# Patient Record
Sex: FEMALE | Race: ASIAN | NOT HISPANIC OR LATINO | Employment: FULL TIME | URBAN - METROPOLITAN AREA
[De-identification: names, ages, dates, MRNs, and addresses within clinical notes are randomized per-mention and may not be internally consistent; named-entity substitution may affect disease eponyms.]

---

## 2017-03-28 ENCOUNTER — OFFICE VISIT - HEALTHEAST (OUTPATIENT)
Dept: ALLERGY | Facility: CLINIC | Age: 27
End: 2017-03-28

## 2017-03-28 DIAGNOSIS — L23.9 ALLERGIC CONTACT DERMATITIS: ICD-10-CM

## 2017-05-04 ENCOUNTER — COMMUNICATION - HEALTHEAST (OUTPATIENT)
Dept: OBGYN | Facility: CLINIC | Age: 27
End: 2017-05-04

## 2017-08-07 ENCOUNTER — COMMUNICATION - HEALTHEAST (OUTPATIENT)
Dept: MIDWIFE SERVICES | Facility: CLINIC | Age: 27
End: 2017-08-07

## 2017-10-31 ENCOUNTER — COMMUNICATION - HEALTHEAST (OUTPATIENT)
Dept: MIDWIFE SERVICES | Facility: CLINIC | Age: 27
End: 2017-10-31

## 2017-11-06 ENCOUNTER — COMMUNICATION - HEALTHEAST (OUTPATIENT)
Dept: OBGYN | Facility: CLINIC | Age: 27
End: 2017-11-06

## 2018-01-19 ENCOUNTER — OFFICE VISIT - HEALTHEAST (OUTPATIENT)
Dept: FAMILY MEDICINE | Facility: CLINIC | Age: 28
End: 2018-01-19

## 2018-01-19 DIAGNOSIS — Z00.00 ROUTINE GENERAL MEDICAL EXAMINATION AT A HEALTH CARE FACILITY: ICD-10-CM

## 2018-01-19 DIAGNOSIS — Z12.4 SCREENING FOR CERVICAL CANCER: ICD-10-CM

## 2018-01-22 LAB
BKR LAB AP ABNORMAL BLEEDING: NO
BKR LAB AP BIRTH CONTROL/HORMONES: NORMAL
BKR LAB AP CERVICAL APPEARANCE: NORMAL
BKR LAB AP GYN ADEQUACY: NORMAL
BKR LAB AP GYN INTERPRETATION: NORMAL
BKR LAB AP HPV REFLEX: NORMAL
BKR LAB AP LMP: NORMAL
BKR LAB AP PATIENT STATUS: NORMAL
BKR LAB AP PREVIOUS ABNORMAL: NO
BKR LAB AP PREVIOUS NORMAL: 2015
HIGH RISK?: NO
PATH REPORT.COMMENTS IMP SPEC: NORMAL
RESULT FLAG (HE HISTORICAL CONVERSION): NORMAL

## 2018-12-18 ENCOUNTER — OFFICE VISIT - HEALTHEAST (OUTPATIENT)
Dept: FAMILY MEDICINE | Facility: CLINIC | Age: 28
End: 2018-12-18

## 2018-12-18 DIAGNOSIS — Z00.00 ROUTINE GENERAL MEDICAL EXAMINATION AT A HEALTH CARE FACILITY: ICD-10-CM

## 2018-12-18 DIAGNOSIS — R03.0 ELEVATED BLOOD PRESSURE READING WITHOUT DIAGNOSIS OF HYPERTENSION: ICD-10-CM

## 2018-12-18 ASSESSMENT — MIFFLIN-ST. JEOR: SCORE: 1504.63

## 2019-07-12 ENCOUNTER — OFFICE VISIT - HEALTHEAST (OUTPATIENT)
Dept: FAMILY MEDICINE | Facility: CLINIC | Age: 29
End: 2019-07-12

## 2019-07-12 DIAGNOSIS — B35.3 TINEA PEDIS OF RIGHT FOOT: ICD-10-CM

## 2019-07-12 ASSESSMENT — MIFFLIN-ST. JEOR: SCORE: 1499.01

## 2019-11-23 ENCOUNTER — COMMUNICATION - HEALTHEAST (OUTPATIENT)
Dept: FAMILY MEDICINE | Facility: CLINIC | Age: 29
End: 2019-11-23

## 2019-11-23 DIAGNOSIS — Z78.9 USES BIRTH CONTROL: ICD-10-CM

## 2020-02-14 ENCOUNTER — OFFICE VISIT - HEALTHEAST (OUTPATIENT)
Dept: FAMILY MEDICINE | Facility: CLINIC | Age: 30
End: 2020-02-14

## 2020-02-14 DIAGNOSIS — Z00.00 ROUTINE GENERAL MEDICAL EXAMINATION AT A HEALTH CARE FACILITY: ICD-10-CM

## 2020-02-14 DIAGNOSIS — Z31.69 ENCOUNTER FOR PRECONCEPTION CONSULTATION: ICD-10-CM

## 2020-02-14 DIAGNOSIS — Z30.41 ENCOUNTER FOR SURVEILLANCE OF CONTRACEPTIVE PILLS: ICD-10-CM

## 2020-02-14 LAB — HIV 1+2 AB+HIV1 P24 AG SERPL QL IA: NEGATIVE

## 2020-02-14 RX ORDER — LEVONORGESTREL/ETHIN.ESTRADIOL 0.1-0.02MG
1 TABLET ORAL DAILY
Qty: 84 TABLET | Refills: 3 | Status: ON HOLD | OUTPATIENT
Start: 2020-02-14 | End: 2022-11-27

## 2020-02-14 ASSESSMENT — MIFFLIN-ST. JEOR: SCORE: 1480.65

## 2020-03-02 ENCOUNTER — OFFICE VISIT - HEALTHEAST (OUTPATIENT)
Dept: FAMILY MEDICINE | Facility: CLINIC | Age: 30
End: 2020-03-02

## 2020-03-02 DIAGNOSIS — L71.0 PERIORAL DERMATITIS: ICD-10-CM

## 2020-03-02 RX ORDER — METRONIDAZOLE 10 MG/G
GEL TOPICAL
Qty: 45 G | Refills: 0 | Status: SHIPPED | OUTPATIENT
Start: 2020-03-02 | End: 2024-08-13

## 2020-03-02 RX ORDER — PIMECROLIMUS 10 MG/G
CREAM TOPICAL
Qty: 30 G | Refills: 1 | Status: ON HOLD | OUTPATIENT
Start: 2020-03-02 | End: 2022-11-27

## 2020-03-02 ASSESSMENT — MIFFLIN-ST. JEOR: SCORE: 1497.65

## 2020-03-03 ENCOUNTER — COMMUNICATION - HEALTHEAST (OUTPATIENT)
Dept: FAMILY MEDICINE | Facility: CLINIC | Age: 30
End: 2020-03-03

## 2020-03-17 ENCOUNTER — COMMUNICATION - HEALTHEAST (OUTPATIENT)
Dept: FAMILY MEDICINE | Facility: CLINIC | Age: 30
End: 2020-03-17

## 2020-03-17 DIAGNOSIS — L71.0 PERIORAL DERMATITIS: ICD-10-CM

## 2020-03-18 RX ORDER — ERYTHROMYCIN 20 MG/G
GEL TOPICAL
Qty: 30 G | Refills: 0 | Status: SHIPPED | OUTPATIENT
Start: 2020-03-18 | End: 2024-08-13

## 2020-05-18 ENCOUNTER — COMMUNICATION - HEALTHEAST (OUTPATIENT)
Dept: FAMILY MEDICINE | Facility: CLINIC | Age: 30
End: 2020-05-18

## 2020-05-18 DIAGNOSIS — L71.0 PERIORAL DERMATITIS: ICD-10-CM

## 2020-06-09 ENCOUNTER — RECORDS - HEALTHEAST (OUTPATIENT)
Dept: ADMINISTRATIVE | Facility: OTHER | Age: 30
End: 2020-06-09

## 2021-02-22 ENCOUNTER — AMBULATORY - HEALTHEAST (OUTPATIENT)
Dept: NURSING | Facility: CLINIC | Age: 31
End: 2021-02-22

## 2021-03-15 ENCOUNTER — AMBULATORY - HEALTHEAST (OUTPATIENT)
Dept: NURSING | Facility: CLINIC | Age: 31
End: 2021-03-15

## 2021-05-30 NOTE — PROGRESS NOTES
"Assessment / Impression     1. Tinea pedis of right foot  terbinafine HCl (LAMISIL) 1 % cream         Plan:     Discussed diagnosis with patient.  Recommend topical terbinafine daily for up to 4 weeks.  If symptoms persist or worsen over the next 1 to 2 weeks, she should let me know, consider oral antifungals at that time.    Return in about 2 weeks (around 7/26/2019), or if symptoms worsen or fail to improve.    Subjective:      HPI: Dari Tidwell is a 29 y.o. female who presents for right foot rash.  She noted itchiness between her third and fourth digits of her right foot about 1 week ago, though over time rash feels it has gotten worse.  She has tried to use over-the-counter miconazole powder and clotrimazole cream, though does not feel it has particularly helped.  She has not had any fevers.      Medical History:     Patient Active Problem List   Diagnosis     Overweight     Contraception       No past medical history on file.    No past surgical history on file.    Current Medications:     Current Outpatient Medications   Medication Sig     levonorgestrel-ethinyl estradiol (AVIANE,ALESSE,LESSINA) 0.1-20 mg-mcg per tablet Take 1 tablet by mouth daily.     MULTIVITAMIN ORAL Take by mouth.     terbinafine HCl (LAMISIL) 1 % cream Apply topically at bedtime.       Family History:   No family history on file.    Review of Systems  All other systems reviewed and are negative.         Social History:     Social History     Tobacco Use   Smoking Status Never Smoker   Smokeless Tobacco Never Used     Social History     Social History Narrative    Works as a pharmacy tech.           Objective:     /80 (Patient Site: Right Arm, Patient Position: Sitting, Cuff Size: Adult Regular)   Pulse 62   Temp 98.4  F (36.9  C) (Oral)   Resp 12   Ht 5' 3.7\" (1.618 m)   Wt 177 lb 3.2 oz (80.4 kg)   LMP 07/10/2019 (Exact Date)   Breastfeeding? No   BMI 30.70 kg/m    Physical Examination: General appearance - alert, well " appearing, and in no distress  Eyes: pupils equal and reactive, extraocular eye movements intact  Extremities: No edema, no clubbing or cyanosis.  Right foot: erythematous scaling an erosion noted between 3rd and 4th digital interspaces.  Fissure also noted  Psychiatric: Normal affect. Does not appear anxious or depressed.    No results found for this or any previous visit (from the past 168 hour(s)).      Liv Tomas MD  7/12/2019  9:07 AM

## 2021-06-02 VITALS — HEIGHT: 64 IN | WEIGHT: 178.44 LBS | BODY MASS INDEX: 30.46 KG/M2

## 2021-06-03 VITALS — WEIGHT: 177.2 LBS | HEIGHT: 64 IN | BODY MASS INDEX: 30.25 KG/M2

## 2021-06-03 NOTE — TELEPHONE ENCOUNTER
Refill Approved    Rx renewed per Medication Renewal Policy. Medication was last renewed on 12/18/2018 for 84/3  Last OV 7/12/2019  Dania Grayson, [facility] Triage/Med Refill 11/23/2019     Requested Prescriptions   Pending Prescriptions Disp Refills     LESSINA 0.1-20 mg-mcg per tablet [Pharmacy Med Name: LESSINA TABLETS 28] 84 tablet 0     Sig: TAKE 1 TABLET BY MOUTH ONCE DAILY       Oral Contraceptives Protocol Passed - 11/23/2019  3:11 AM        Passed - Visit with PCP or prescribing provider visit in last 12 months      Last office visit with prescriber/PCP: 7/12/2019 Liv Tomas MD OR same dept: 7/12/2019 Liv Tomas MD OR same specialty: 7/12/2019 Liv Tomas MD  Last physical: 12/18/2018 Last MTM visit: Visit date not found   Next visit within 3 mo: Visit date not found  Next physical within 3 mo: Visit date not found  Prescriber OR PCP: Liv Tomas MD  Last diagnosis associated with med order: 1. Uses birth control  - LESSINA 0.1-20 mg-mcg per tablet [Pharmacy Med Name: LESSINA TABLETS 28]; TAKE 1 TABLET BY MOUTH ONCE DAILY  Dispense: 84 tablet; Refill: 0    If protocol passes may refill for 12 months if within 3 months of last provider visit (or a total of 15 months).

## 2021-06-04 VITALS
HEIGHT: 64 IN | HEART RATE: 66 BPM | WEIGHT: 176.9 LBS | DIASTOLIC BLOOD PRESSURE: 62 MMHG | SYSTOLIC BLOOD PRESSURE: 108 MMHG | BODY MASS INDEX: 30.2 KG/M2 | RESPIRATION RATE: 16 BRPM

## 2021-06-04 VITALS
RESPIRATION RATE: 17 BRPM | HEIGHT: 63 IN | HEART RATE: 84 BPM | SYSTOLIC BLOOD PRESSURE: 136 MMHG | WEIGHT: 175.6 LBS | DIASTOLIC BLOOD PRESSURE: 72 MMHG | BODY MASS INDEX: 31.11 KG/M2

## 2021-06-06 NOTE — PROGRESS NOTES
Assessment / Impression     1. Perioral dermatitis  pimecrolimus (ELIDEL) 1 % cream    metroNIDAZOLE (METROGEL) 1 % gel         Plan:     Discussed with patient findings on exam, appears to be perioral dermatitis.  Given she has already tried topical clotrimazole and terbinafine, first preference would be for topical pimecrolimus.  However, this medication is too expensive, would recommend she try topical metronidazole.  Discussed use.  Follow-up in 2 weeks if symptoms persist, sooner if they worsen.  She may continue to use Aquaphor petroleum jelly as needed.    Return in about 2 weeks (around 3/16/2020), or if symptoms worsen or fail to improve.    Subjective:      HPI: Dari Tidwell is a 29 y.o. female who presents for rash around her lip.  Symptoms started around Christmas, 2019 and severity can fluctuate, though it is relatively mild at this time.  She does put Aquaphor petroleum jelly on it which helps with the dryness, though rash is still present.  She has also tried topical clotrimazole and terbinafine, which has only helped mildly.  No fevers or other rash present on body.        Medical History:     Patient Active Problem List   Diagnosis     Overweight     Uses birth control       No past medical history on file.    No past surgical history on file.    Current Medications:     Current Outpatient Medications   Medication Sig     levonorgestrel-ethinyl estradiol (LESSINA) 0.1-20 mg-mcg per tablet Take 1 tablet by mouth daily.     MULTIVITAMIN ORAL Take by mouth.     metroNIDAZOLE (METROGEL) 1 % gel Apply topically to affected skin twice daily for 1-2 weeks.     pimecrolimus (ELIDEL) 1 % cream Apply to affected area 2 times daily for 1-2 weeks.       Family History:   No family history on file.    Review of Systems  All other systems reviewed and are negative.         Social History:     Social History     Tobacco Use   Smoking Status Never Smoker   Smokeless Tobacco Never Used     Social History  "    Social History Narrative    Works as a pharmacy tech.      Lives with  and in-laws.           Objective:     /62 (Patient Site: Right Arm, Patient Position: Sitting, Cuff Size: Adult Regular)   Pulse 66   Resp 16   Ht 5' 3.7\" (1.618 m)   Wt 176 lb 14.4 oz (80.2 kg)   LMP 02/27/2020 (Approximate)   Breastfeeding No   BMI 30.65 kg/m    Physical Examination: General appearance - alert, well appearing, and in no distress  Eyes: extraocular eye movements intact  Mouth: mucous membranes moist, pharynx normal without lesions  Neck: supple, no significant adenopathy or thyromegaly  Skin: there are multiple 1-2mm pink papules and mild pinkness of skin in perioral region.  No rash elsewhere in body.  Lips normal.  Extremities: No edema, no clubbing or cyanosis  Psychiatric: Normal affect. Does not appear anxious or depressed.    No results found for this or any previous visit (from the past 168 hour(s)).      Liv Tomas MD  3/2/2020  1:48 PM        "

## 2021-06-06 NOTE — TELEPHONE ENCOUNTER
Drug Change Request  Who is calling?:  Patient  What is the current medication?:    Disp Refills Start End     metroNIDAZOLE (METROGEL) 1 % gel 45 g 0 3/2/2020     Sig: Apply topically to affected skin twice daily for 1-2 weeks.    Sent to pharmacy as: metroNIDAZOLE 1 % topical gel (METROGEL)    Notes to Pharmacy: Fill this rx if Elidel is too expensive    E-Prescribing Status: Receipt confirmed by pharmacy (3/2/2020  1:51 PM CST)      What alternative is being requested?: a cheaper option  Why the request to change?:  Patient stated she checked online and the metronidazole gel is too expensive. Patient stated she did not check for the pimecrolimus cream yet. Patient was advised to call the pharmacy about the pimecrolimus cream and call us back if needed.  Requested Pharmacy?: Leonid Urenaay to leave a detailed message?:  Yes

## 2021-06-08 NOTE — TELEPHONE ENCOUNTER
Left message to call back for: M for Pt  Information to relay to patient:  Phone number to the dermatologist is [phone]

## 2021-06-09 NOTE — PROGRESS NOTES
Assessment:    Allergic contact dermatitis with probable Gold sensitivity.    Plan:    Avoidance when possible.  Consider painting inside of gold ring with clear nail polish.  Removal with exercise and sweating.  Patch testing could be done.  We do true test patch testing.  Call to set up if wanting to pursue.  Reviewed alternative metals for replacing band.  ____________________________________________________________________________                                                             Dari is here today because of concern of possible allergy to her wedding ring.  She got it in October 2015.  Since then she's had gradual irritation directly under the ring.  The dry itchy rash.  Usually it's relieved when she takes the ring off.  Often the rash returns within 1-2 weeks of replacing the ring.  She reports that this is 14 carot  White gold.  She has had a previous reaction to a gold ring in 2010.  She reports having a silver ring that she does not respond to.  Generally she has not responded to inexpensive jewelry or earrings.  She reports no reactions to her pants snap.  No other skin rashes.  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  She does frequently wash her hands.  She does wear latex free sterile gloves.      Review of symptoms:  as above otherwise negative.    Past medical history: No other chronic medical conditions noted.    Allergies: No known allergies to medications, latex , foods or hymenoptera venom.    Family history: Mother with contact allergy to nickel    Social history: Currently is looking the same house for 23 years.  There is a dog in the home.  No cigarette smoking history.  Work history as above.    Medications: Reviewed in chart.    Physical Exam:  General:  Alert and oriented.  Eyes:  Sclera clear.  Ears: TMs translucent grey with bony landmarks visible. Nose: Pale, boggy mucosal membranes.  Throat: Pink, mosit.  No lesions.  Neck: Supple.  No  lymphadenopathy.  Lungs: CTA.  CV: Regular rate and rhythm. Extremities: Well perfused.  No clubbing or cyanosis. Skin:  Faint erythema thickened skin underneath the left hand fourth digit, beneath her ring.    This transcription uses voice recognition software, which may contain typographical errors.

## 2021-06-15 PROBLEM — Z78.9 USES BIRTH CONTROL: Status: ACTIVE | Noted: 2017-05-08

## 2021-06-15 NOTE — PROGRESS NOTES
Assessment:      Healthy female exam.     1. Routine general medical examination at a health care facility     2. Contraception  levonorgestrel-ethinyl estradiol (AVIANE,ALESSE,LESSINA) 0.1-20 mg-mcg per tablet   3. Screening for cervical cancer  Gynecologic Cytology (PAP Smear)        Plan:     1. Routine general medical examination at a health care facility      2. Contraception  Refilled rx today  - levonorgestrel-ethinyl estradiol (AVIANE,ALESSE,LESSINA) 0.1-20 mg-mcg per tablet; Take 1 tablet by mouth daily.  Dispense: 84 tablet; Refill: 3    3. Screening for cervical cancer  Done today-will inform patient of results and plan once we have results  - Gynecologic Cytology (PAP Smear)      The following high BMI interventions were performed this visit: dietary management education, guidance, and counseling      Pap smear.     Subjective:      Dari Tidwell is a 27 y.o. female, new to me, who presents for an annual exam. The patient is sexually active. The patient participates in regular exercise: no. The patient reports that there is not domestic violence in her life. Additional concerns:     Would like OCP refilled.  Hasn't had break in use.  Non smoker.  No other contraindications of use.      Healthy Habits:   Regular Exercise: No  Sunscreen Use: Yes and On face and neck   Healthy Diet: Yes  Dental Visits Regularly: Yes  Seat Belt: Yes  Sexually active: Yes  Self Breast Exam Monthly:Yes  Hemoccults: No  Flex Sig: No  Colonoscopy: No  Lipid Profile: No  Glucose Screen: No  Prevention of Osteoporosis: N/A  Last Dexa: N/A        Immunization History   Administered Date(s) Administered     HPV Quadrivalent 11/06/2008, 01/15/2009, 05/08/2009     Hep B, Adult 09/05/2014, 10/22/2014, 06/09/2015     Hep B, historic 09/05/2014, 10/22/2014     Influenza, inj, historic,unspecified 01/01/1900     Influenza, seasonal,quad inj 6-35 mos 10/22/2014     Tdap 07/28/2014     Immunization status: up to date and documented.    No  exam data present    Gynecologic History  Patient's last menstrual period was 01/09/2018.  Contraception: OCP (estrogen/progesterone)  Last Pap: 8/15. Results were: normal  Last mammogram: n/a.     OB History   No data available       Current Outpatient Prescriptions   Medication Sig Dispense Refill     MULTIVITAMIN ORAL Take by mouth.       levonorgestrel-ethinyl estradiol (AVIANE,ALESSE,LESSINA) 0.1-20 mg-mcg per tablet Take 1 tablet by mouth daily. 84 tablet 3     misoprostol (CYTOTEC) 200 MCG tablet Two tablets intravaginally 6-8 hours before procedure 2 tablet 0     No current facility-administered medications for this visit.      History reviewed. No pertinent past medical history.  History reviewed. No pertinent surgical history.  Review of patient's allergies indicates no known allergies.  History reviewed. No pertinent family history.  Social History     Social History     Marital status:      Spouse name: N/A     Number of children: N/A     Years of education: N/A     Occupational History     Not on file.     Social History Main Topics     Smoking status: Never Smoker     Smokeless tobacco: Never Used     Alcohol use Yes      Comment: rare; less than 1 drink/month     Drug use: Not on file     Sexual activity: Yes     Partners: Male     Other Topics Concern     Not on file     Social History Narrative    Works as a pharmacy tech.         Review of Systems  General:  Denies problem  Eyes: Denies problem  Ears/Nose/Throat: Denies problem  Cardiovascular: Denies problem  Respiratory:  Denies problem  Gastrointestinal:  Denies problem  Genitourinary: Denies problem  Musculoskeletal:  Denies problem  Skin: Denies problem  Neurologic: Denies problem  Psychiatric: Denies problem  Endocrine: Denies problem  Heme/Lymphatic: Denies problem   Allergic/Immunologic: Denies problem        Objective:         Vitals:    01/19/18 1157   BP: 114/72   Pulse: 68     There is no height or weight on file to calculate  BMI.    Physical Exam:  General Appearance: Alert, cooperative, no distress, appears stated age  Head: Normocephalic, without obvious abnormality, atraumatic  Eyes: PERRL, conjunctiva/corneas clear, EOM's intact  Ears: Normal TM's and external ear canals, both ears  Nose: Nares normal, septum midline,mucosa normal, no drainage  Throat: Lips, mucosa, and tongue normal; teeth and gums normal  Neck: Supple, symmetrical, trachea midline, no adenopathy;  thyroid: not enlarged, symmetric, no tenderness/mass/nodules;   Back: Symmetric, no curvature, ROM normal, no CVA tenderness  Lungs: Clear to auscultation bilaterally, respirations unlabored  Breasts: No breast masses, tenderness, asymmetry, or nipple discharge.  Heart: Regular rate and rhythm, no murmur.   Abdomen: Soft, non-tender, bowel sounds active all four quadrants,  no masses, no organomegaly  Pelvic:Normally developed genitalia with no external lesions or eruptions. Vagina and cervix show no lesions, inflammation, discharge or tenderness. Uterus normal size.  No adnexal mass or tenderness.  Extremities: Extremities normal, atraumatic, no cyanosis or edema  Skin: Skin color, texture, turgor normal, no rashes or lesions  Lymph nodes: Cervical, supraclavicular, and axillary nodes normal  Neurologic: Alert.   Psych: Normal affect.  Does not appear anxious or depressed.         Liv Tomas MD  1/19/2018

## 2021-06-26 ENCOUNTER — HEALTH MAINTENANCE LETTER (OUTPATIENT)
Age: 31
End: 2021-06-26

## 2021-06-27 NOTE — PROGRESS NOTES
Progress Notes by Liv Tomas MD at 12/18/2018  8:50 AM     Author: Liv Tomas MD Service: -- Author Type: Physician    Filed: 12/18/2018  9:19 AM Encounter Date: 12/18/2018 Status: Signed    : Liv Tomas MD (Physician)       FEMALE PREVENTATIVE EXAM    Assessment and Plan:       1. Routine general medical examination at a health care facility      2. Contraception  3. Elevated blood pressure reading without diagnosis of hypertension  - levonorgestrel-ethinyl estradiol (AVIANE,ALESSE,LESSINA) 0.1-20 mg-mcg per tablet; Take 1 tablet by mouth daily.  Dispense: 84 tablet; Refill: 3  Discussed with patient that her blood pressure is mildly elevated today.  Discussed with patient potential adverse effects of elevated blood pressure, with being on only OCP.    Next follow up:  Return in about 3 months (around 3/18/2019) for nurse visit to check blood pressure.    Immunization Review  Adult Imm Review: No immunizations due today  BMI: Discussed exercise and weight loss treatment      I discussed the following with the patient:   Adult Healthy Living: Importance of regular exercise        Subjective:   Chief Complaint: Dari Tidwell is an 28 y.o. female here for a preventative health visit.     HPI:  She would like refill of OCP today.  No adverse effects of medication.      Healthy Habits  Are you taking a daily aspirin? No  Do you typically exercising at least 40 min, 3-4 times per week?  NO  Do you usually eat at least 4 servings of fruit and vegetables a day, include whole grains and fiber and avoid regularly eating high fat foods? NO  Have you had an eye exam in the past two years? NO  Do you see a dentist twice per year? Yes  Do you have any concerns regarding sleep? No    Safety Screen  If you own firearms, are they secured in a locked gun cabinet or with trigger locks? Yes  Do you feel you are safe where you are living?: Yes (12/18/2018  8:56 AM)  Do you feel you are safe in your  "relationship(s)?: Yes (12/18/2018  8:56 AM)      Review of Systems:  Please see above.  The rest of the review of systems are negative for all systems.     Pap History:   No - age 21-29 PAP every 3 years recommended  Cancer Screening       Status Date      PAP SMEAR Next Due 1/19/2021      Done 1/19/2018 GYNECOLOGIC CYTOLOGY (PAP SMEAR)     Patient has more history with this topic...          Patient Care Team:  Liv Tomas MD as PCP - General (Family Medicine)        History     Reviewed By Date/Time Sections Reviewed    Liv Tomas MD 12/18/2018  9:06 AM Medical, Surgical, Family    [place], Jacquelyn ROBBINS CMA 12/18/2018  8:56 AM Tobacco            Objective:   Vital Signs:   Visit Vitals  /80 (Patient Site: Left Arm, Patient Position: Sitting, Cuff Size: Adult Regular)   Pulse 72   Temp 98.6  F (37  C) (Oral)   Resp 20   Ht 5' 3.7\" (1.618 m)   Wt 178 lb 7 oz (80.9 kg)   LMP 12/05/2018 (Exact Date)   Breastfeeding? No   BMI 30.92 kg/m           PHYSICAL EXAM  General Appearance: Alert, cooperative, no distress, appears stated age  Head: Normocephalic, without obvious abnormality, atraumatic  Eyes: PERRL, conjunctiva/corneas clear, EOM's intact  Ears: Normal TM's and external ear canals, both ears  Nose: Nares normal, septum midline,mucosa normal, no drainage  Throat: Lips, mucosa, and tongue normal; teeth and gums normal  Neck: Supple, symmetrical, trachea midline, no adenopathy;  thyroid: not enlarged, symmetric, no tenderness/mass/nodules;   Back: Symmetric, no curvature, ROM normal, no CVA tenderness  Lungs: Clear to auscultation bilaterally, respirations unlabored  Breasts: No breast masses, tenderness, asymmetry, or nipple discharge.  Heart: Regular rate and rhythm, no murmur.   Abdomen: Soft, non-tender, bowel sounds active all four quadrants,  no masses, no organomegaly  Pelvic:Not examined  Extremities: Extremities normal, atraumatic, no cyanosis or edema  Skin: Skin color, texture, turgor " normal, no rashes or lesions  Lymph nodes: Cervical, supraclavicular, and axillary nodes normal  Neurologic: Alert.   Psych: Normal affect.  Does not appear anxious or depressed.               Medication List           Accurate as of 12/18/18  9:16 AM. If you have any questions, ask your nurse or doctor.               CONTINUE taking these medications    levonorgestrel-ethinyl estradiol 0.1-20 mg-mcg per tablet  Also known as:  ROD RANGEL LESSINA  INSTRUCTIONS:  Take 1 tablet by mouth daily.        MULTIVITAMIN ORAL  INSTRUCTIONS:  Take by mouth.           STOP taking these medications    miSOPROStol 200 MCG tablet  Also known as:  CYTOTEC  Stopped by:  Liv Tomas MD           Where to Get Your Medications      These medications were sent to [facility] [address] [place] [address] [address] AT [place] [address] [address], [facility] [address]    Phone:  [phone]     levonorgestrel-ethinyl estradiol 0.1-20 mg-mcg per tablet         Additional Screenings Completed Today:

## 2021-06-28 NOTE — PROGRESS NOTES
Progress Notes by Liv Tomas MD at 2/14/2020  3:30 PM     Author: Liv Tomas MD Service: -- Author Type: Physician    Filed: 2/14/2020  4:03 PM Encounter Date: 2/14/2020 Status: Signed    : Liv Tomas MD (Physician)       FEMALE PREVENTATIVE EXAM    Assessment and Plan:     1. Routine general medical examination at a health care facility  Discussed one-time HIV screening, ordered today, will inform patient of results.  - HIV Antigen/Antibody Screening [place]    2. Encounter for surveillance of contraceptive pills  Blood pressure is slightly elevated today.  She will continue to periodically monitor blood pressure, certainly if it is consistently above 140/90, she will let me know.  She does understand the potential increased risk with taking OCP with elevated blood pressure, such as risk of stroke.  - levonorgestrel-ethinyl estradiol (LESSINA) 0.1-20 mg-mcg per tablet; Take 1 tablet by mouth daily.  Dispense: 84 tablet; Refill: 3    3. Encounter for preconception consultation  Counseled patient regarding stopping OCP a few months prior to trying to conceive.  Also discussed starting prenatal vitamin a few months prior to trying to conceive.          Next follow up:  Return in about 1 year (around 2/14/2021) for Annual physical.    Immunization Review  Adult Imm Review: No immunizations due today  BMI: Discussed weight loss, lifestyle changes    I discussed the following with the patient:   Adult Healthy Living: Importance of regular exercise  Healthy nutrition  Weight loss referral options      Subjective:   Chief Complaint: Dari Tidwell is an 29 y.o. female here for a preventative health visit.     HPI: She would like her OCP refilled today.  She does not anticipate being on medication for an entire year, as she and her  are thinking about trying to conceive soon.  Blood pressure is mildly elevated today, though denies any headache, chest pain, vision changes.  She does check her  "blood pressure at work periodically, has never had it measured this high.    Healthy Habits  Are you taking a daily aspirin? No  Do you typically exercising at least 40 min, 3-4 times per week?  NO  Do you usually eat at least 4 servings of fruit and vegetables a day, include whole grains and fiber and avoid regularly eating high fat foods? NO  Have you had an eye exam in the past two years? Yes  Do you see a dentist twice per year? Yes  Do you have any concerns regarding sleep? No    Safety Screen  If you own firearms, are they secured in a locked gun cabinet or with trigger locks? The patient does not own any firearms  Do you feel you are safe where you are living?: Yes (2/14/2020  3:35 PM)  Do you feel you are safe in your relationship(s)?: Yes (2/14/2020  3:35 PM)      Review of Systems:  Please see above.  The rest of the review of systems are negative for all systems.     Pap History:   No - age 21-29 PAP every 3 years recommended  Cancer Screening       Status Date      PAP SMEAR Next Due 1/19/2021      Done 1/19/2018 GYNECOLOGIC CYTOLOGY (PAP SMEAR)     Patient has more history with this topic...          Patient Care Team:  Liv Tomas MD as PCP - General (Family Medicine)  Liv Tomas MD as Assigned PCP        History     Reviewed By Date/Time Sections Reviewed    Liv Tomas MD 2/14/2020  3:45 PM Tobacco, Alcohol, Drug Use, Sexual Activity, Social Documentation    Liv Tomas MD 2/14/2020  3:44 PM Medical, Surgical, Family    Norah Dhillon [facility] 2/14/2020  3:37 PM Tobacco            Objective:   Vital Signs:   Visit Vitals  /72   Pulse 84   Resp 17   Ht 5' 3\" (1.6 m)   Wt 175 lb 9.6 oz (79.7 kg)   LMP 01/24/2020 (Approximate)   BMI 31.11 kg/m           PHYSICAL EXAM  General Appearance: Alert, cooperative, no distress, appears stated age  Head: Normocephalic, without obvious abnormality, atraumatic  Eyes: PERRL, conjunctiva/corneas clear, EOM's intact  Ears: Normal TM's and " external ear canals, both ears  Nose: Nares normal, septum midline,mucosa normal, no drainage  Throat: Lips, mucosa, and tongue normal; teeth and gums normal  Neck: Supple, symmetrical, trachea midline, no adenopathy;  thyroid: not enlarged, symmetric, no tenderness/mass/nodules;   Back: Symmetric, no curvature, ROM normal, no CVA tenderness  Lungs: Clear to auscultation bilaterally, respirations unlabored  Breasts: No breast masses, tenderness, asymmetry, or nipple discharge.  Heart: Regular rate and rhythm, no murmur.   Abdomen: Soft, non-tender, bowel sounds active all four quadrants,  no masses, no organomegaly  Pelvic:Not examined  Extremities: Extremities normal, atraumatic, no cyanosis or edema  Skin: Skin color, texture, turgor normal, no rashes or lesions  Lymph nodes: Cervical, supraclavicular, and axillary nodes normal  Neurologic: Alert.   Psych: Normal affect.  Does not appear anxious or depressed.               Medication List          Accurate as of February 14, 2020  4:00 PM. If you have any questions, ask your nurse or doctor.            CONTINUE taking these medications    levonorgestrel-ethinyl estradiol 0.1-20 mg-mcg per tablet  Also known as:  Lessina  INSTRUCTIONS:  Take 1 tablet by mouth daily.        MULTIVITAMIN ORAL  INSTRUCTIONS:  Take by mouth.              Where to Get Your Medications      These medications were sent to [id number] [place] [address] AT [place] [address], [facility] [id number]    Phone:  [phone]     levonorgestrel-ethinyl estradiol 0.1-20 mg-mcg per tablet         Additional Screenings Completed Today:

## 2021-10-16 ENCOUNTER — HEALTH MAINTENANCE LETTER (OUTPATIENT)
Age: 31
End: 2021-10-16

## 2022-01-28 ENCOUNTER — HOSPITAL ENCOUNTER (OUTPATIENT)
Dept: RADIOLOGY | Facility: CLINIC | Age: 32
Discharge: HOME | End: 2022-01-28
Attending: OBSTETRICS & GYNECOLOGY | Admitting: OBSTETRICS & GYNECOLOGY
Payer: COMMERCIAL

## 2022-01-28 DIAGNOSIS — Z31.9 ENCOUNTER FOR PROCREATIVE MANAGEMENT, UNSPECIFIED: ICD-10-CM

## 2022-01-28 PROCEDURE — 74740 X-RAY FEMALE GENITAL TRACT: CPT

## 2022-01-28 PROCEDURE — 58340 CATHETER FOR HYSTEROGRAPHY: CPT

## 2022-01-28 RX ORDER — IOPAMIDOL 612 MG/ML
10 INJECTION, SOLUTION INTRAVASCULAR ONCE
Status: DISCONTINUED | OUTPATIENT
Start: 2022-01-28 | End: 2022-01-29 | Stop reason: HOSPADM

## 2022-07-23 ENCOUNTER — HEALTH MAINTENANCE LETTER (OUTPATIENT)
Age: 32
End: 2022-07-23

## 2022-10-01 ENCOUNTER — HEALTH MAINTENANCE LETTER (OUTPATIENT)
Age: 32
End: 2022-10-01

## 2022-11-09 ENCOUNTER — LAB REQUISITION (OUTPATIENT)
Dept: LAB | Facility: CLINIC | Age: 32
End: 2022-11-09
Payer: COMMERCIAL

## 2022-11-09 DIAGNOSIS — Z36.3 ENCOUNTER FOR ANTENATAL SCREENING FOR MALFORMATIONS: ICD-10-CM

## 2022-11-09 PROCEDURE — 87653 STREP B DNA AMP PROBE: CPT | Mod: ORL | Performed by: OBSTETRICS & GYNECOLOGY

## 2022-11-10 LAB — GP B STREP DNA SPEC QL NAA+PROBE: NEGATIVE

## 2022-11-25 ENCOUNTER — ANESTHESIA EVENT (OUTPATIENT)
Dept: OBGYN | Facility: HOSPITAL | Age: 32
End: 2022-11-25
Payer: COMMERCIAL

## 2022-11-25 ENCOUNTER — ANESTHESIA (OUTPATIENT)
Dept: OBGYN | Facility: HOSPITAL | Age: 32
End: 2022-11-25
Payer: COMMERCIAL

## 2022-11-25 ENCOUNTER — HOSPITAL ENCOUNTER (INPATIENT)
Facility: HOSPITAL | Age: 32
LOS: 2 days | Discharge: HOME | End: 2022-11-27
Attending: OBSTETRICS & GYNECOLOGY | Admitting: OBSTETRICS & GYNECOLOGY
Payer: COMMERCIAL

## 2022-11-25 PROBLEM — Z34.90 PREGNANT: Status: ACTIVE | Noted: 2022-11-25

## 2022-11-25 LAB
ABO/RH(D): NORMAL
ANTIBODY SCREEN: NEGATIVE
HOLD SPECIMEN: NORMAL
SARS-COV-2 RNA RESP QL NAA+PROBE: NEGATIVE
SPECIMEN EXPIRATION DATE: NORMAL
T PALLIDUM AB SER QL: NONREACTIVE

## 2022-11-25 PROCEDURE — 86901 BLOOD TYPING SEROLOGIC RH(D): CPT | Performed by: OBSTETRICS & GYNECOLOGY

## 2022-11-25 PROCEDURE — 250N000011 HC RX IP 250 OP 636: Performed by: ANESTHESIOLOGY

## 2022-11-25 PROCEDURE — 370N000003 HC ANESTHESIA WARD SERVICE

## 2022-11-25 PROCEDURE — 250N000009 HC RX 250: Performed by: ANESTHESIOLOGY

## 2022-11-25 PROCEDURE — 250N000013 HC RX MED GY IP 250 OP 250 PS 637: Performed by: OBSTETRICS & GYNECOLOGY

## 2022-11-25 PROCEDURE — 00HU33Z INSERTION OF INFUSION DEVICE INTO SPINAL CANAL, PERCUTANEOUS APPROACH: ICD-10-PCS | Performed by: ANESTHESIOLOGY

## 2022-11-25 PROCEDURE — 722N000001 HC LABOR CARE VAGINAL DELIVERY SINGLE

## 2022-11-25 PROCEDURE — 0KQM0ZZ REPAIR PERINEUM MUSCLE, OPEN APPROACH: ICD-10-PCS | Performed by: OBSTETRICS & GYNECOLOGY

## 2022-11-25 PROCEDURE — 86780 TREPONEMA PALLIDUM: CPT | Performed by: OBSTETRICS & GYNECOLOGY

## 2022-11-25 PROCEDURE — 250N000011 HC RX IP 250 OP 636: Performed by: OBSTETRICS & GYNECOLOGY

## 2022-11-25 PROCEDURE — 3E0R3BZ INTRODUCTION OF ANESTHETIC AGENT INTO SPINAL CANAL, PERCUTANEOUS APPROACH: ICD-10-PCS | Performed by: ANESTHESIOLOGY

## 2022-11-25 PROCEDURE — 36415 COLL VENOUS BLD VENIPUNCTURE: CPT | Performed by: OBSTETRICS & GYNECOLOGY

## 2022-11-25 PROCEDURE — 120N000001 HC R&B MED SURG/OB

## 2022-11-25 PROCEDURE — U0003 INFECTIOUS AGENT DETECTION BY NUCLEIC ACID (DNA OR RNA); SEVERE ACUTE RESPIRATORY SYNDROME CORONAVIRUS 2 (SARS-COV-2) (CORONAVIRUS DISEASE [COVID-19]), AMPLIFIED PROBE TECHNIQUE, MAKING USE OF HIGH THROUGHPUT TECHNOLOGIES AS DESCRIBED BY CMS-2020-01-R: HCPCS | Performed by: OBSTETRICS & GYNECOLOGY

## 2022-11-25 RX ORDER — OXYTOCIN 10 [USP'U]/ML
10 INJECTION, SOLUTION INTRAMUSCULAR; INTRAVENOUS
Status: DISCONTINUED | OUTPATIENT
Start: 2022-11-25 | End: 2022-11-27 | Stop reason: HOSPADM

## 2022-11-25 RX ORDER — KETOROLAC TROMETHAMINE 30 MG/ML
30 INJECTION, SOLUTION INTRAMUSCULAR; INTRAVENOUS
Status: DISCONTINUED | OUTPATIENT
Start: 2022-11-25 | End: 2022-11-27 | Stop reason: HOSPADM

## 2022-11-25 RX ORDER — ACETAMINOPHEN 325 MG/1
650 TABLET ORAL EVERY 4 HOURS PRN
Status: DISCONTINUED | OUTPATIENT
Start: 2022-11-25 | End: 2022-11-27 | Stop reason: HOSPADM

## 2022-11-25 RX ORDER — NALOXONE HYDROCHLORIDE 0.4 MG/ML
0.4 INJECTION, SOLUTION INTRAMUSCULAR; INTRAVENOUS; SUBCUTANEOUS
Status: DISCONTINUED | OUTPATIENT
Start: 2022-11-25 | End: 2022-11-25 | Stop reason: HOSPADM

## 2022-11-25 RX ORDER — OXYTOCIN/0.9 % SODIUM CHLORIDE 30/500 ML
340 PLASTIC BAG, INJECTION (ML) INTRAVENOUS CONTINUOUS PRN
Status: DISCONTINUED | OUTPATIENT
Start: 2022-11-25 | End: 2022-11-25 | Stop reason: HOSPADM

## 2022-11-25 RX ORDER — CARBOPROST TROMETHAMINE 250 UG/ML
250 INJECTION, SOLUTION INTRAMUSCULAR
Status: DISCONTINUED | OUTPATIENT
Start: 2022-11-25 | End: 2022-11-25 | Stop reason: HOSPADM

## 2022-11-25 RX ORDER — FENTANYL CITRATE-0.9 % NACL/PF 10 MCG/ML
100 PLASTIC BAG, INJECTION (ML) INTRAVENOUS EVERY 5 MIN PRN
Status: DISCONTINUED | OUTPATIENT
Start: 2022-11-25 | End: 2022-11-25 | Stop reason: HOSPADM

## 2022-11-25 RX ORDER — FENTANYL CITRATE 50 UG/ML
100 INJECTION, SOLUTION INTRAMUSCULAR; INTRAVENOUS
Status: DISCONTINUED | OUTPATIENT
Start: 2022-11-25 | End: 2022-11-25 | Stop reason: HOSPADM

## 2022-11-25 RX ORDER — IBUPROFEN 800 MG/1
800 TABLET, FILM COATED ORAL EVERY 6 HOURS PRN
Status: DISCONTINUED | OUTPATIENT
Start: 2022-11-25 | End: 2022-11-27 | Stop reason: HOSPADM

## 2022-11-25 RX ORDER — IBUPROFEN 800 MG/1
800 TABLET, FILM COATED ORAL
Status: DISCONTINUED | OUTPATIENT
Start: 2022-11-25 | End: 2022-11-27 | Stop reason: HOSPADM

## 2022-11-25 RX ORDER — METHYLERGONOVINE MALEATE 0.2 MG/ML
200 INJECTION INTRAVENOUS
Status: DISCONTINUED | OUTPATIENT
Start: 2022-11-25 | End: 2022-11-25 | Stop reason: HOSPADM

## 2022-11-25 RX ORDER — MISOPROSTOL 200 UG/1
400 TABLET ORAL
Status: DISCONTINUED | OUTPATIENT
Start: 2022-11-25 | End: 2022-11-27 | Stop reason: HOSPADM

## 2022-11-25 RX ORDER — NALOXONE HYDROCHLORIDE 0.4 MG/ML
0.2 INJECTION, SOLUTION INTRAMUSCULAR; INTRAVENOUS; SUBCUTANEOUS
Status: DISCONTINUED | OUTPATIENT
Start: 2022-11-25 | End: 2022-11-25 | Stop reason: HOSPADM

## 2022-11-25 RX ORDER — MISOPROSTOL 200 UG/1
800 TABLET ORAL
Status: DISCONTINUED | OUTPATIENT
Start: 2022-11-25 | End: 2022-11-25 | Stop reason: HOSPADM

## 2022-11-25 RX ORDER — DOCUSATE SODIUM 100 MG/1
100 CAPSULE, LIQUID FILLED ORAL DAILY
Status: DISCONTINUED | OUTPATIENT
Start: 2022-11-25 | End: 2022-11-27 | Stop reason: HOSPADM

## 2022-11-25 RX ORDER — METHYLERGONOVINE MALEATE 0.2 MG/ML
200 INJECTION INTRAVENOUS
Status: DISCONTINUED | OUTPATIENT
Start: 2022-11-25 | End: 2022-11-27 | Stop reason: HOSPADM

## 2022-11-25 RX ORDER — CITRIC ACID/SODIUM CITRATE 334-500MG
30 SOLUTION, ORAL ORAL
Status: DISCONTINUED | OUTPATIENT
Start: 2022-11-25 | End: 2022-11-25 | Stop reason: HOSPADM

## 2022-11-25 RX ORDER — NALBUPHINE HYDROCHLORIDE 10 MG/ML
2.5-5 INJECTION, SOLUTION INTRAMUSCULAR; INTRAVENOUS; SUBCUTANEOUS EVERY 6 HOURS PRN
Status: DISCONTINUED | OUTPATIENT
Start: 2022-11-25 | End: 2022-11-27 | Stop reason: HOSPADM

## 2022-11-25 RX ORDER — MODIFIED LANOLIN
OINTMENT (GRAM) TOPICAL
Status: DISCONTINUED | OUTPATIENT
Start: 2022-11-25 | End: 2022-11-27 | Stop reason: HOSPADM

## 2022-11-25 RX ORDER — BISACODYL 10 MG
10 SUPPOSITORY, RECTAL RECTAL DAILY PRN
Status: DISCONTINUED | OUTPATIENT
Start: 2022-11-25 | End: 2022-11-27 | Stop reason: HOSPADM

## 2022-11-25 RX ORDER — MISOPROSTOL 200 UG/1
800 TABLET ORAL
Status: DISCONTINUED | OUTPATIENT
Start: 2022-11-25 | End: 2022-11-27 | Stop reason: HOSPADM

## 2022-11-25 RX ORDER — CARBOPROST TROMETHAMINE 250 UG/ML
250 INJECTION, SOLUTION INTRAMUSCULAR
Status: DISCONTINUED | OUTPATIENT
Start: 2022-11-25 | End: 2022-11-27 | Stop reason: HOSPADM

## 2022-11-25 RX ORDER — LIDOCAINE HYDROCHLORIDE AND EPINEPHRINE 15; 5 MG/ML; UG/ML
INJECTION, SOLUTION EPIDURAL PRN
Status: DISCONTINUED | OUTPATIENT
Start: 2022-11-25 | End: 2022-11-25

## 2022-11-25 RX ORDER — OXYTOCIN/0.9 % SODIUM CHLORIDE 30/500 ML
340 PLASTIC BAG, INJECTION (ML) INTRAVENOUS CONTINUOUS PRN
Status: DISCONTINUED | OUTPATIENT
Start: 2022-11-25 | End: 2022-11-27 | Stop reason: HOSPADM

## 2022-11-25 RX ORDER — PROCHLORPERAZINE MALEATE 10 MG
10 TABLET ORAL EVERY 6 HOURS PRN
Status: DISCONTINUED | OUTPATIENT
Start: 2022-11-25 | End: 2022-11-25 | Stop reason: HOSPADM

## 2022-11-25 RX ORDER — MISOPROSTOL 200 UG/1
400 TABLET ORAL
Status: DISCONTINUED | OUTPATIENT
Start: 2022-11-25 | End: 2022-11-25 | Stop reason: HOSPADM

## 2022-11-25 RX ORDER — HYDROCORTISONE 25 MG/G
CREAM TOPICAL 3 TIMES DAILY PRN
Status: DISCONTINUED | OUTPATIENT
Start: 2022-11-25 | End: 2022-11-27 | Stop reason: HOSPADM

## 2022-11-25 RX ORDER — ONDANSETRON 2 MG/ML
4 INJECTION INTRAMUSCULAR; INTRAVENOUS EVERY 6 HOURS PRN
Status: DISCONTINUED | OUTPATIENT
Start: 2022-11-25 | End: 2022-11-25 | Stop reason: HOSPADM

## 2022-11-25 RX ORDER — FENTANYL/ROPIVACAINE/NS/PF 2MCG/ML-.1
PLASTIC BAG, INJECTION (ML) EPIDURAL
Status: DISCONTINUED | OUTPATIENT
Start: 2022-11-25 | End: 2022-11-25 | Stop reason: HOSPADM

## 2022-11-25 RX ORDER — METOCLOPRAMIDE HYDROCHLORIDE 5 MG/ML
10 INJECTION INTRAMUSCULAR; INTRAVENOUS EVERY 6 HOURS PRN
Status: DISCONTINUED | OUTPATIENT
Start: 2022-11-25 | End: 2022-11-25 | Stop reason: HOSPADM

## 2022-11-25 RX ORDER — OXYTOCIN 10 [USP'U]/ML
10 INJECTION, SOLUTION INTRAMUSCULAR; INTRAVENOUS
Status: DISCONTINUED | OUTPATIENT
Start: 2022-11-25 | End: 2022-11-25 | Stop reason: HOSPADM

## 2022-11-25 RX ORDER — ONDANSETRON 4 MG/1
4 TABLET, ORALLY DISINTEGRATING ORAL EVERY 6 HOURS PRN
Status: DISCONTINUED | OUTPATIENT
Start: 2022-11-25 | End: 2022-11-25 | Stop reason: HOSPADM

## 2022-11-25 RX ORDER — OXYTOCIN/0.9 % SODIUM CHLORIDE 30/500 ML
100-340 PLASTIC BAG, INJECTION (ML) INTRAVENOUS CONTINUOUS PRN
Status: DISCONTINUED | OUTPATIENT
Start: 2022-11-25 | End: 2022-11-27 | Stop reason: HOSPADM

## 2022-11-25 RX ORDER — METOCLOPRAMIDE 10 MG/1
10 TABLET ORAL EVERY 6 HOURS PRN
Status: DISCONTINUED | OUTPATIENT
Start: 2022-11-25 | End: 2022-11-25 | Stop reason: HOSPADM

## 2022-11-25 RX ORDER — PROCHLORPERAZINE 25 MG
25 SUPPOSITORY, RECTAL RECTAL EVERY 12 HOURS PRN
Status: DISCONTINUED | OUTPATIENT
Start: 2022-11-25 | End: 2022-11-25 | Stop reason: HOSPADM

## 2022-11-25 RX ADMIN — ACETAMINOPHEN 650 MG: 325 TABLET, FILM COATED ORAL at 11:55

## 2022-11-25 RX ADMIN — BENZOCAINE AND LEVOMENTHOL: 200; 5 SPRAY TOPICAL at 11:15

## 2022-11-25 RX ADMIN — ACETAMINOPHEN 650 MG: 325 TABLET, FILM COATED ORAL at 16:31

## 2022-11-25 RX ADMIN — ACETAMINOPHEN 650 MG: 325 TABLET, FILM COATED ORAL at 07:41

## 2022-11-25 RX ADMIN — WITCH HAZEL: 500 SOLUTION RECTAL; TOPICAL at 11:14

## 2022-11-25 RX ADMIN — IBUPROFEN 800 MG: 800 TABLET ORAL at 14:00

## 2022-11-25 RX ADMIN — Medication: at 04:36

## 2022-11-25 RX ADMIN — DOCUSATE SODIUM 100 MG: 100 CAPSULE, LIQUID FILLED ORAL at 07:42

## 2022-11-25 RX ADMIN — KETOROLAC TROMETHAMINE 30 MG: 30 INJECTION, SOLUTION INTRAMUSCULAR at 07:41

## 2022-11-25 RX ADMIN — LIDOCAINE HYDROCHLORIDE,EPINEPHRINE BITARTRATE 3 ML: 15; .005 INJECTION, SOLUTION EPIDURAL; INFILTRATION; INTRACAUDAL; PERINEURAL at 04:41

## 2022-11-25 RX ADMIN — LIDOCAINE HYDROCHLORIDE,EPINEPHRINE BITARTRATE 5 ML: 15; .005 INJECTION, SOLUTION EPIDURAL; INFILTRATION; INTRACAUDAL; PERINEURAL at 04:45

## 2022-11-25 ASSESSMENT — ACTIVITIES OF DAILY LIVING (ADL)
ADLS_ACUITY_SCORE: 18
CHANGE_IN_FUNCTIONAL_STATUS_SINCE_ONSET_OF_CURRENT_ILLNESS/INJURY: NO
TOILETING_ISSUES: NO
DIFFICULTY_EATING/SWALLOWING: NO
FALL_HISTORY_WITHIN_LAST_SIX_MONTHS: NO
ADLS_ACUITY_SCORE: 18
ADLS_ACUITY_SCORE: 18
DOING_ERRANDS_INDEPENDENTLY_DIFFICULTY: NO
DIFFICULTY_COMMUNICATING: NO
ADLS_ACUITY_SCORE: 18
ADLS_ACUITY_SCORE: 18
WALKING_OR_CLIMBING_STAIRS_DIFFICULTY: NO
ADLS_ACUITY_SCORE: 18
HEARING_DIFFICULTY_OR_DEAF: NO
ADLS_ACUITY_SCORE: 18
DRESSING/BATHING_DIFFICULTY: NO
WEAR_GLASSES_OR_BLIND: NO
ADLS_ACUITY_SCORE: 18
CONCENTRATING,_REMEMBERING_OR_MAKING_DECISIONS_DIFFICULTY: NO

## 2022-11-25 ASSESSMENT — ENCOUNTER SYMPTOMS: SEIZURES: 0

## 2022-11-25 NOTE — L&D DELIVERY NOTE
VAGINAL DELIVERY NOTE    PRE DELIVERY DIAGNOSIS  1) Intrauterine pregnancy at 39w1d       POST DELIVERY DIAGNOSIS  1) Intrauterine pregnancy at 39w1d   2) Delivery of a viable male.  3) Apgars: 9 and 9  4) weight: pending    Delivery Method/Type:       PROVIDER:   Jennifer Scherer DO     ANESTHESIA: Epidural    COMPLICATIONS: None apparent    DESCRIPTION OF PROCEDURE  Dari Tidwell is a 32 year old  with prenatal care with Niesha who was admitted for labor. Patient had a .  Delayed cord clamping performed.  No gross fetal defects were observed. Pitocin was administered. Placenta delivered intact with 3 vessel cord. The perineum was then evaluated and noted to have a 2nd  Degree labial laceration that was repaired in the usual manner with 3-0 Vicryl Rapide. Delivery QBL (mL): 200    Jennifer Scherer DO

## 2022-11-25 NOTE — PROGRESS NOTES
Pt presented to [facility] for c/o labor that started around 6810-6366 am this morning.  Pt denies, leaking fluids, reports bloody show, denies headache, visual changes and epigastric.  Pt oriented  to room and call light.  EFM and toco applied.  SVE: 7/60/0 with bloody show.  EFM: category one,  dae very 1-3 minutes. Palpating strong.   Pt desires labor epidural.    Dr. Medina update, orders received to admit pt an place intrapartum orders, pt ok to have labor epidural.    IV placed and started, fluids for epidural.

## 2022-11-25 NOTE — ANESTHESIA PROCEDURE NOTES
"Epidural catheter Procedure Note    Pre-Procedure   Staff -        Anesthesiologist:  Gael Biswas MD       Performed By: anesthesiologist       Location: OB       Procedure Start/Stop Times: 11/25/2022 4:28 AM and 11/25/2022 4:43 AM       Pre-Anesthestic Checklist: patient identified, IV checked, risks and benefits discussed, informed consent, monitors and equipment checked, pre-op evaluation, at physician/surgeon's request and post-op pain management  Timeout:       Correct Patient: Yes        Correct Procedure: Yes        Correct Site: Yes        Correct Position: Yes   Procedure Documentation  Procedure: epidural catheter       Patient Position: sitting       Skin prep: Chloraprep       Local skin infiltrated with mL of 1% lidocaine.        Insertion Site: L3-4. (midline approach).       Technique: LORT air        AIXA at 5 cm.       Needle Type: [facility]       Needle Gauge: 20.        Needle Length (Inches): 3.5           Catheter threaded easily.           Threaded 10 cm at skin.         # of attempts: 1 and  # of redirects:     Assessment/Narrative         Paresthesias: No.       Test dose of 3 mL lidocaine 1.5% w/ 1:200,000 epinephrine at.         Test dose negative, 3 minutes after injection, for signs of intravascular, subdural, or intrathecal injection.       Insertion/Infusion Method: LORT air       Aspiration negative for Heme or CSF via Epidural Catheter.    Medication(s) Administered   Medication Administration Time: 11/25/2022 4:28 AM      FOR [facility] ([facility]) ONLY:   Pain Team Contact information: please page the Pain Team Via Teamisto. Search \"Pain\". During daytime hours, please page the attending first. At night please page the resident first.    "

## 2022-11-25 NOTE — PROGRESS NOTES
Outreach Nurse Note    Dari Tidwell  [id number]  1990    This writer, Outreach Nurse, rounded on couplet to inform them of the insurance check completed re: home nurse visit coverage. Informed Dari a  apt was made for Monday,  at [facility], in case she declines a home visit. Dari plans to stay 2 nights and decisions will be made at time of discharge. (Note: Several clinics in the area have limited open appointments with the Thanksgiving holiday, so Outreach was proactive and scheduled a  clinic visit for Monday.)     Dari's address and phone numbers confirmed in case a home nurse visit is needed. Dari reports she has baby care essentials.  Outreach RN will continue to follow and assist if needed with discharge plan. No additional needs identified at this time.    Santiago Mancera RN

## 2022-11-25 NOTE — PLAN OF CARE
Problem: Plan of Care - These are the overarching goals to be used throughout the patient stay.    Goal: Optimal Comfort and Wellbeing  Outcome: Progressing  Intervention: Provide Person-Centered Care  Recent Flowsheet Documentation  Taken 11/25/2022 1600 by Kerrie Mondragon, RN  Trust Relationship/Rapport:   care explained   choices provided   questions encouraged   emotional support provided   Pain well managed with PRN Tylenol and Motrin, she rates pain 2/10. VSS, requires assistance with breastfeeding Krerie Mondragon, RN

## 2022-11-25 NOTE — H&P
"OB HISTORY AND PHYSICAL UPDATE ADMISSION EXAM    Dari Tidwell  1990  [id number]    HPI: 33yo  @ 39 1/7wks.  Uncomplicated pregnancy.  Josseline Scherer.     Estimated Date of Delivery: Dec 1, 2022                       EGA 39w1d    OB History    Para Term  AB Living   1 0 0 0 0 0   SAB IAB Ectopic Multiple Live Births   0 0 0 0 0      # Outcome Date GA Lbr Yuniel/2nd Weight Sex Delivery Anes PTL Lv   1 Current                Prenatal Complications None    Exam:    /67   Temp 98  F (36.7  C) (Oral)   Resp 20   Ht 1.626 m (5' 4\")   Wt 84.4 kg (186 lb)   SpO2 100%   BMI 31.93 kg/m          HEENT          WNL              Heart              WNL               Lungs             WNL                      Abdomen        WNL                       Extremities     WNL                     Vaginal exam 10/100/0      Fetal Status     Category 1    Assessment: Labor    Plan: Spontaneous labor, anticipate vaginal delivery    Jennifer Scherer DO, FACOG  2022 6:29 AM     "

## 2022-11-25 NOTE — ANESTHESIA PREPROCEDURE EVALUATION
Anesthesia Pre-Procedure Evaluation    Patient: Dari Tidwell   MRN: [id number] : 1990        Procedure : * No procedures listed *          History reviewed. No pertinent past medical history.   Past Surgical History:   Procedure Laterality Date     WISDOM TOOTH EXTRACTION        No Known Allergies   Social History     Tobacco Use     Smoking status: Never     Smokeless tobacco: Never   Substance Use Topics     Alcohol use: Yes     Comment: Alcoholic Drinks/day: rare; less than 1 drink/month      Wt Readings from Last 1 Encounters:   22 84.4 kg (186 lb)        Anesthesia Evaluation            ROS/MED HX  ENT/Pulmonary:  - neg pulmonary ROS  (-) sleep apnea   Neurologic:  - neg neurologic ROS  (-) no seizures and no CVA   Cardiovascular:  - neg cardiovascular ROS     METS/Exercise Tolerance:     Hematologic:  - neg hematologic  ROS     Musculoskeletal:  - neg musculoskeletal ROS     GI/Hepatic:  - neg GI/hepatic ROS     Renal/Genitourinary:  - neg Renal ROS     Endo:  - neg endo ROS     Psychiatric/Substance Use:  - neg psychiatric ROS     Infectious Disease:  - neg infectious disease ROS     Malignancy:       Other:      (+) Possibly pregnant, ,         Physical Exam    Airway        Mallampati: II   TM distance: > 3 FB   Neck ROM: full     Respiratory Devices and Support         Dental           Cardiovascular             Pulmonary                   OUTSIDE LABS:  CBC: No results found for: WBC, HGB, HCT, PLT  BMP: No results found for: NA, POTASSIUM, CHLORIDE, CO2, BUN, CR, GLC  COAGS: No results found for: PTT, INR, FIBR  POC: No results found for: BGM, HCG, HCGS  HEPATIC: No results found for: ALBUMIN, PROTTOTAL, ALT, AST, GGT, ALKPHOS, BILITOTAL, BILIDIRECT, WILDA  OTHER: No results found for: PH, LACT, A1C, KIERAN, PHOS, MAG, LIPASE, AMYLASE, TSH, T4, T3, CRP, SED    Anesthesia Plan    ASA Status:  2      Anesthesia Type: Epidural.              Consents    Anesthesia Plan(s) and associated risks,  benefits, and realistic alternatives discussed. Questions answered and patient/representative(s) expressed understanding.    - Discussed:     - Discussed with:  Patient         Postoperative Care            Comments:                Gael Biswas MD

## 2022-11-25 NOTE — PROGRESS NOTES
Pt had prolong decelerations starting 0504  FHR down to 60's.   See flow sheet for interventions.  Provider at beside to assess pt.

## 2022-11-26 LAB — HGB BLD-MCNC: 11 G/DL (ref 11.7–15.7)

## 2022-11-26 PROCEDURE — 250N000013 HC RX MED GY IP 250 OP 250 PS 637: Performed by: OBSTETRICS & GYNECOLOGY

## 2022-11-26 PROCEDURE — 85018 HEMOGLOBIN: CPT | Performed by: OBSTETRICS & GYNECOLOGY

## 2022-11-26 PROCEDURE — 120N000001 HC R&B MED SURG/OB

## 2022-11-26 PROCEDURE — 36415 COLL VENOUS BLD VENIPUNCTURE: CPT | Performed by: OBSTETRICS & GYNECOLOGY

## 2022-11-26 RX ADMIN — ACETAMINOPHEN 650 MG: 325 TABLET, FILM COATED ORAL at 04:54

## 2022-11-26 RX ADMIN — IBUPROFEN 800 MG: 800 TABLET ORAL at 22:38

## 2022-11-26 RX ADMIN — DOCUSATE SODIUM 100 MG: 100 CAPSULE, LIQUID FILLED ORAL at 09:10

## 2022-11-26 RX ADMIN — IBUPROFEN 800 MG: 800 TABLET ORAL at 10:11

## 2022-11-26 ASSESSMENT — ACTIVITIES OF DAILY LIVING (ADL)
ADLS_ACUITY_SCORE: 18

## 2022-11-26 NOTE — PLAN OF CARE
Minimal pain, treated with ibuprofen with good relief. Voiding without difficulty, ambulating in room.   Problem: Plan of Care - These are the overarching goals to be used throughout the patient stay.    Goal: Plan of Care Review  Description: The Plan of Care Review/Shift note should be completed every shift.  The Outcome Evaluation is a brief statement about your assessment that the patient is improving, declining, or no change.  This information will be displayed automatically on your shift note.  Outcome: Progressing     Problem: Plan of Care - These are the overarching goals to be used throughout the patient stay.    Goal: Optimal Comfort and Wellbeing  Intervention: Monitor Pain and Promote Comfort  Recent Flowsheet Documentation  Taken 11/26/2022 1011 by Malini Faust RN  Pain Management Interventions:    medication (see MAR)    emotional support

## 2022-11-26 NOTE — PROGRESS NOTES
"Postpartum Day 1: Vaginal Delivery    Subjective:  The patient feels well. The patient has no emotional concerns. Pain is controlled with current medications. The patient is ambulating well. She is voiding and passing gas.The amount and color of the lochia is appropriate for the duration of recovery, patient denies clots. The baby is doing great.    Objective   The patient has a blood pressure which is within the normal range. The uterine fundus is firm. Urinary output is adequate.     Exam:   /70   Pulse 82   Temp 98.5  F (36.9  C) (Oral)   Resp 16   Ht 1.626 m (5' 4\")   Wt 84.4 kg (186 lb)   SpO2 98%   Breastfeeding Unknown   BMI 31.93 kg/m    General: NAD  Abdomen: soft, NT   Fundus: firm, NT  Ext: no pain     Impression:   Normal postpartum course    Plan:   - Continue current care.  - Home tomorrow.        Jennifer Scherer DO, FACOG  11/26/2022 10:33 AM     "

## 2022-11-26 NOTE — PLAN OF CARE
Problem: Plan of Care - These are the overarching goals to be used throughout the patient stay.    Goal: Patient-Specific Goal (Individualized) Dari has frozen colostrum stored here to be used if baby is hungry after breastfeeding, and she will continue to pump.  Description: Outcome: Progressing

## 2022-11-26 NOTE — ANESTHESIA POSTPROCEDURE EVALUATION
Patient: Dari Tidwell    Procedure: * No procedures listed *       Anesthesia Type:  Epidural    Note:  Disposition: Inpatient   Postop Pain Control: Uneventful            Sign Out: Well controlled pain   PONV: No   Neuro/Psych: Uneventful            Sign Out: Acceptable/Baseline neuro status   Airway/Respiratory: Uneventful            Sign Out: Acceptable/Baseline resp. status   CV/Hemodynamics: Uneventful            Sign Out: Acceptable CV status; No obvious hypovolemia; No obvious fluid overload   Other NRE: NONE   DID A NON-ROUTINE EVENT OCCUR? No    Event details/Postop Comments:  Recovering as expected, ambulating, no headaches, no concerns.             Last vitals:  Vitals:    11/25/22 1330 11/25/22 1549 11/26/22 0030   BP: 118/79 127/72 136/70   Pulse: 78 82    Resp: 20 16 16   Temp: 36.8  C (98.2  F) 36.8  C (98.3  F) 36.9  C (98.5  F)   SpO2:  98%        Electronically Signed By: Gael Biswas MD  November 26, 2022  7:35 AM

## 2022-11-27 VITALS
RESPIRATION RATE: 16 BRPM | BODY MASS INDEX: 31.76 KG/M2 | WEIGHT: 186 LBS | OXYGEN SATURATION: 98 % | DIASTOLIC BLOOD PRESSURE: 77 MMHG | SYSTOLIC BLOOD PRESSURE: 134 MMHG | HEIGHT: 64 IN | TEMPERATURE: 97.1 F | HEART RATE: 85 BPM

## 2022-11-27 PROCEDURE — 250N000013 HC RX MED GY IP 250 OP 250 PS 637: Performed by: OBSTETRICS & GYNECOLOGY

## 2022-11-27 RX ORDER — IBUPROFEN 800 MG/1
800 TABLET, FILM COATED ORAL EVERY 6 HOURS PRN
COMMUNITY
Start: 2022-11-27 | End: 2024-08-13

## 2022-11-27 RX ORDER — ACETAMINOPHEN 325 MG/1
650 TABLET ORAL EVERY 4 HOURS PRN
COMMUNITY
Start: 2022-11-27 | End: 2024-08-13

## 2022-11-27 RX ORDER — DOCUSATE SODIUM 100 MG/1
100 CAPSULE, LIQUID FILLED ORAL DAILY PRN
COMMUNITY
Start: 2022-11-27 | End: 2024-08-13

## 2022-11-27 RX ADMIN — DOCUSATE SODIUM 100 MG: 100 CAPSULE, LIQUID FILLED ORAL at 08:46

## 2022-11-27 RX ADMIN — IBUPROFEN 800 MG: 800 TABLET ORAL at 08:46

## 2022-11-27 ASSESSMENT — ACTIVITIES OF DAILY LIVING (ADL)
ADLS_ACUITY_SCORE: 18

## 2022-11-27 NOTE — DISCHARGE SUMMARY
"    HISTORY OF PRESENT ILLNESS AND HOSPITAL COURSE: This is a 32 year old,  female who underwent Normal spontaneous vaginal delivery. Post partum course was unremarkable. On the day that she was discharged, vital signs were stable. Her pain was controlled, she was tolerating diet. She was voiding and passing gas. Current with flu vaccine.    LABS:  Lab Results   Component Value Date    HGB 11.0 (L) 2022       EXAM:  Blood pressure 126/62, pulse 85, temperature 98  F (36.7  C), temperature source Oral, resp. rate 16, height 1.626 m (5' 4\"), weight 84.4 kg (186 lb), SpO2 98 %, unknown if currently breastfeeding.  General: NAD  Abdomen: Soft, non-tender  Uterine fundus: Firm, non-tender  Extremities: no pain, 1+ ankle edema, no calf tenderness    DISPOSITION:  Home    CONDITION AT DISCHARGE:  Good/Stable    Discharge Medications:   See AVS    DISCHARGE PLAN:   - Follow up with  MD, in 4-6 weeks, unless indicated sooner  - Take medication as prescribed  - Physical activity: As tolerated, no heavy lifting. Pelvic rest.  - Diet:  Regular  - Medication:  Please see MAR  - Warning signs discussed with patient about when to call the clinic/hospital  - All questions and concerns were answered for the patient prior to discharge.     Juani Gaitan MD FACOG  [facility]  [phone]    I saw the patient on the date of discharge  Total time spent for discharge on date of discharge: 20 minutes    "

## 2022-11-27 NOTE — LACTATION NOTE
Lactation consultant to patient room to assess breastfeeding (history, goals, & current experience). Mom states she thinks breastfeeding is off to a good start and has no concerns at this time.    Reviewed benefit of skin to skin prior to feeding to help get baby awake and ready for feeding, importance of feeding baby on early hunger cues, and breastfeeding 8-12 times in 24 hours for adequate infant nutrition and hydration as well as for building an optimal milk supply.  Reviewed techniques for keeping infant actively sucking, including breast compressions.    Anticipatory guidance given on input/output goals, normal feeding amounts the first week, cluster feeding, engorgement, and pumping. Reviewed online and outpatient lactation resources for breastfeeding help after discharge.     Answered questions and gave encouragement.

## 2022-11-27 NOTE — PLAN OF CARE
Problem: Postpartum (Vaginal Delivery)  Goal: Optimal Pain Control and Function  11/27/2022 0115 by Kerrie Mondragon, RN  Outcome: Progressing  11/26/2022 1900 by Kerrie Mondragon RN  Outcome: Progressing    Dari received Motrin for 3/10 perineal pain. Sitz bath  2 x daily discuss ed for comfort. Kerrie Mondragon, RN

## 2022-11-27 NOTE — PLAN OF CARE
Dari has met Postpartum recovery goals and meets discharge criteria. Dari has bene seen by provider and orders received. Discharge instructions reviewed. Patient discharge home with spouse and baby.

## 2022-11-27 NOTE — PLAN OF CARE
Problem: Postpartum (Vaginal Delivery)  Goal: Successful Maternal Role Transition  Outcome: Progressing     Problem: Postpartum (Vaginal Delivery)  Goal: Optimal Pain Control and Function  Outcome: Progressing   Stable Post-partum recovery, breastfeeding getting better, pain well managed wiht Tylenol /Motrin prn. Dari is meeting discharge goals Kerrie Mondragon RN

## 2022-11-28 ENCOUNTER — MEDICAL CORRESPONDENCE (OUTPATIENT)
Dept: FAMILY MEDICINE | Facility: CLINIC | Age: 32
End: 2022-11-28

## 2023-01-16 ENCOUNTER — LAB REQUISITION (OUTPATIENT)
Dept: LAB | Facility: CLINIC | Age: 33
End: 2023-01-16

## 2023-01-16 DIAGNOSIS — Z12.4 ENCOUNTER FOR SCREENING FOR MALIGNANT NEOPLASM OF CERVIX: ICD-10-CM

## 2023-01-16 PROCEDURE — G0145 SCR C/V CYTO,THINLAYER,RESCR: HCPCS | Performed by: OBSTETRICS & GYNECOLOGY

## 2023-01-19 LAB
BKR LAB AP GYN ADEQUACY: NORMAL
BKR LAB AP GYN INTERPRETATION: NORMAL
BKR LAB AP HPV REFLEX: NORMAL
BKR LAB AP LMP: NORMAL
BKR LAB AP PREVIOUS ABNL DX: NORMAL
BKR LAB AP PREVIOUS ABNORMAL: NORMAL
PATH REPORT.COMMENTS IMP SPEC: NORMAL
PATH REPORT.COMMENTS IMP SPEC: NORMAL
PATH REPORT.RELEVANT HX SPEC: NORMAL

## 2023-02-21 ENCOUNTER — MEDICAL CORRESPONDENCE (OUTPATIENT)
Dept: HEALTH INFORMATION MANAGEMENT | Facility: CLINIC | Age: 33
End: 2023-02-21
Payer: COMMERCIAL

## 2023-03-14 ENCOUNTER — MEDICAL CORRESPONDENCE (OUTPATIENT)
Dept: HEALTH INFORMATION MANAGEMENT | Facility: CLINIC | Age: 33
End: 2023-03-14

## 2023-05-25 ENCOUNTER — MEDICAL CORRESPONDENCE (OUTPATIENT)
Dept: HEALTH INFORMATION MANAGEMENT | Facility: CLINIC | Age: 33
End: 2023-05-25
Payer: COMMERCIAL

## 2023-08-06 ENCOUNTER — HEALTH MAINTENANCE LETTER (OUTPATIENT)
Age: 33
End: 2023-08-06

## 2024-02-07 ENCOUNTER — VIRTUAL VISIT (OUTPATIENT)
Dept: URGENT CARE | Facility: CLINIC | Age: 34
End: 2024-02-07
Payer: COMMERCIAL

## 2024-02-07 DIAGNOSIS — H10.33 ACUTE BACTERIAL CONJUNCTIVITIS OF BOTH EYES: Primary | ICD-10-CM

## 2024-02-07 PROCEDURE — 99441 PR PHYSICIAN TELEPHONE EVALUATION 5-10 MIN: CPT | Mod: 95

## 2024-02-07 RX ORDER — POLYMYXIN B SULFATE AND TRIMETHOPRIM 1; 10000 MG/ML; [USP'U]/ML
1-2 SOLUTION OPHTHALMIC EVERY 4 HOURS
Qty: 10 ML | Refills: 0 | Status: SHIPPED | OUTPATIENT
Start: 2024-02-07 | End: 2024-08-13

## 2024-02-07 NOTE — PATIENT INSTRUCTIONS
1. Warm compress with a clean wet washcloth 4 times a day  2. Use lubricating eye drops AVOID redness reducing drops  3. Practice good hand hygiene. Avoid sharing linens such as pillowcases, towels, or wash clothes. Wash these items on hot to prevent spreading.  4. Take daily antihistamine such as Claritin, Allegra or Zyrtec to help with itching, swelling or discomfort  5. Ice pack to the eyes 10 minutes in AM and PM  6. Follow up if symptoms worsen or if not getting better within 4 days

## 2024-02-07 NOTE — PROGRESS NOTES
Dari is a 33 year old female who presents for a billable telephone visit.   ASSESSMENT/PLAN:  Diagnoses and all orders for this visit:    Acute bacterial conjunctivitis of both eyes  -     polymixin b-trimethoprim (POLYTRIM) 23793-5.1 UNIT/ML-% ophthalmic solution; Place 1-2 drops into both eyes every 4 hours      Patient Instructions   1. Warm compress with a clean wet washcloth 4 times a day  2. Use lubricating eye drops AVOID redness reducing drops  3. Practice good hand hygiene. Avoid sharing linens such as pillowcases, towels, or wash clothes. Wash these items on hot to prevent spreading.  4. Take daily antihistamine such as Claritin, Allegra or Zyrtec to help with itching, swelling or discomfort  5. Ice pack to the eyes 10 minutes in AM and PM  6. Follow up if symptoms worsen or if not getting better within 4 days      Follow up with primary care provider with any problems, questions or concerns or if symptoms worsen or fail to improve. Patient agreed to plan and verbalized understanding.     SUBJECTIVE:  Dari presents with reports of eye redness, crustiness x 1-2 days. It is both eyes. She denies vision changes. She does not wear contact lenses and no trauma or injury. Her son just completed treatment for pink eye.     ROS: Pertinent ROS neg other than the symptoms noted above in the HPI.     OBJECTIVE:  Vitals not done due to this being a virtual visit  GEN: No distress  RESP: No cough, no audible wheezing, able to talk in full sentences  Remainder of exam unable to be completed due to telephone visits    Barney Yepez PA-C    Call length: 8 minutes  Provider location during call: Home  Patient location during call: Home

## 2024-06-04 ENCOUNTER — TRANSFERRED RECORDS (OUTPATIENT)
Dept: HEALTH INFORMATION MANAGEMENT | Facility: CLINIC | Age: 34
End: 2024-06-04

## 2024-06-04 ENCOUNTER — LAB REQUISITION (OUTPATIENT)
Dept: LAB | Facility: CLINIC | Age: 34
End: 2024-06-04

## 2024-06-04 DIAGNOSIS — Z3A.10 10 WEEKS GESTATION OF PREGNANCY: ICD-10-CM

## 2024-06-04 PROCEDURE — 87086 URINE CULTURE/COLONY COUNT: CPT | Performed by: NURSE PRACTITIONER

## 2024-06-05 LAB — BACTERIA UR CULT: NO GROWTH

## 2024-06-10 ENCOUNTER — TRANSFERRED RECORDS (OUTPATIENT)
Dept: HEALTH INFORMATION MANAGEMENT | Facility: CLINIC | Age: 34
End: 2024-06-10

## 2024-06-26 ENCOUNTER — TRANSFERRED RECORDS (OUTPATIENT)
Dept: HEALTH INFORMATION MANAGEMENT | Facility: CLINIC | Age: 34
End: 2024-06-26

## 2024-06-26 ENCOUNTER — LAB REQUISITION (OUTPATIENT)
Dept: LAB | Facility: CLINIC | Age: 34
End: 2024-06-26

## 2024-06-26 DIAGNOSIS — Z01.419 ENCOUNTER FOR GYNECOLOGICAL EXAMINATION (GENERAL) (ROUTINE) WITHOUT ABNORMAL FINDINGS: ICD-10-CM

## 2024-06-26 LAB
ABO/RH(D): NORMAL
ANTIBODY SCREEN: NEGATIVE
BASOPHILS # BLD AUTO: 0 10E3/UL (ref 0–0.2)
BASOPHILS NFR BLD AUTO: 0 %
EOSINOPHIL # BLD AUTO: 0.1 10E3/UL (ref 0–0.7)
EOSINOPHIL NFR BLD AUTO: 1 %
ERYTHROCYTE [DISTWIDTH] IN BLOOD BY AUTOMATED COUNT: 12.5 % (ref 10–15)
HCT VFR BLD AUTO: 35.9 % (ref 35–47)
HGB BLD-MCNC: 11.9 G/DL (ref 11.7–15.7)
IMM GRANULOCYTES # BLD: 0 10E3/UL
IMM GRANULOCYTES NFR BLD: 0 %
LYMPHOCYTES # BLD AUTO: 1.6 10E3/UL (ref 0.8–5.3)
LYMPHOCYTES NFR BLD AUTO: 18 %
MCH RBC QN AUTO: 29.8 PG (ref 26.5–33)
MCHC RBC AUTO-ENTMCNC: 33.1 G/DL (ref 31.5–36.5)
MCV RBC AUTO: 90 FL (ref 78–100)
MONOCYTES # BLD AUTO: 0.4 10E3/UL (ref 0–1.3)
MONOCYTES NFR BLD AUTO: 5 %
NEUTROPHILS # BLD AUTO: 6.8 10E3/UL (ref 1.6–8.3)
NEUTROPHILS NFR BLD AUTO: 76 %
NRBC # BLD AUTO: 0 10E3/UL
NRBC BLD AUTO-RTO: 0 /100
PLATELET # BLD AUTO: 251 10E3/UL (ref 150–450)
RBC # BLD AUTO: 4 10E6/UL (ref 3.8–5.2)
SPECIMEN EXPIRATION DATE: NORMAL
WBC # BLD AUTO: 9 10E3/UL (ref 4–11)

## 2024-06-26 PROCEDURE — 85025 COMPLETE CBC W/AUTO DIFF WBC: CPT | Performed by: OBSTETRICS & GYNECOLOGY

## 2024-06-26 PROCEDURE — 87340 HEPATITIS B SURFACE AG IA: CPT | Performed by: OBSTETRICS & GYNECOLOGY

## 2024-06-26 PROCEDURE — 86900 BLOOD TYPING SEROLOGIC ABO: CPT | Performed by: OBSTETRICS & GYNECOLOGY

## 2024-06-26 PROCEDURE — 86762 RUBELLA ANTIBODY: CPT | Performed by: OBSTETRICS & GYNECOLOGY

## 2024-06-26 PROCEDURE — 86803 HEPATITIS C AB TEST: CPT | Performed by: OBSTETRICS & GYNECOLOGY

## 2024-06-26 PROCEDURE — 87491 CHLMYD TRACH DNA AMP PROBE: CPT | Performed by: OBSTETRICS & GYNECOLOGY

## 2024-06-27 LAB
C TRACH DNA SPEC QL PROBE+SIG AMP: NEGATIVE
HBV SURFACE AG SERPL QL IA: NONREACTIVE
HCV AB SERPL QL IA: NONREACTIVE
N GONORRHOEA DNA SPEC QL NAA+PROBE: NEGATIVE
RUBV IGG SERPL QL IA: 0.5 INDEX
RUBV IGG SERPL QL IA: NORMAL

## 2024-07-15 ENCOUNTER — TRANSFERRED RECORDS (OUTPATIENT)
Dept: HEALTH INFORMATION MANAGEMENT | Facility: CLINIC | Age: 34
End: 2024-07-15
Payer: COMMERCIAL

## 2024-07-16 ENCOUNTER — MEDICAL CORRESPONDENCE (OUTPATIENT)
Dept: HEALTH INFORMATION MANAGEMENT | Facility: CLINIC | Age: 34
End: 2024-07-16
Payer: COMMERCIAL

## 2024-07-17 ENCOUNTER — TRANSCRIBE ORDERS (OUTPATIENT)
Dept: MATERNAL FETAL MEDICINE | Facility: HOSPITAL | Age: 34
End: 2024-07-17
Payer: COMMERCIAL

## 2024-07-17 DIAGNOSIS — O26.90 PREGNANCY RELATED CONDITION, ANTEPARTUM: Primary | ICD-10-CM

## 2024-07-24 ENCOUNTER — TRANSFERRED RECORDS (OUTPATIENT)
Dept: HEALTH INFORMATION MANAGEMENT | Facility: CLINIC | Age: 34
End: 2024-07-24
Payer: COMMERCIAL

## 2024-08-02 ENCOUNTER — PRE VISIT (OUTPATIENT)
Dept: MATERNAL FETAL MEDICINE | Facility: HOSPITAL | Age: 34
End: 2024-08-02

## 2024-08-05 ENCOUNTER — TRANSFERRED RECORDS (OUTPATIENT)
Dept: HEALTH INFORMATION MANAGEMENT | Facility: CLINIC | Age: 34
End: 2024-08-05
Payer: COMMERCIAL

## 2024-08-05 ENCOUNTER — MEDICAL CORRESPONDENCE (OUTPATIENT)
Dept: HEALTH INFORMATION MANAGEMENT | Facility: CLINIC | Age: 34
End: 2024-08-05
Payer: COMMERCIAL

## 2024-08-06 ENCOUNTER — ANCILLARY PROCEDURE (OUTPATIENT)
Dept: ULTRASOUND IMAGING | Facility: HOSPITAL | Age: 34
End: 2024-08-06
Attending: OBSTETRICS & GYNECOLOGY
Payer: COMMERCIAL

## 2024-08-06 ENCOUNTER — OFFICE VISIT (OUTPATIENT)
Dept: MATERNAL FETAL MEDICINE | Facility: HOSPITAL | Age: 34
End: 2024-08-06
Attending: OBSTETRICS & GYNECOLOGY
Payer: COMMERCIAL

## 2024-08-06 DIAGNOSIS — O26.90 PREGNANCY RELATED CONDITION, ANTEPARTUM: ICD-10-CM

## 2024-08-06 DIAGNOSIS — O24.912 DIABETES MELLITUS COMPLICATING PREGNANCY, SECOND TRIMESTER: Primary | ICD-10-CM

## 2024-08-06 PROCEDURE — 76811 OB US DETAILED SNGL FETUS: CPT | Mod: 26 | Performed by: OBSTETRICS & GYNECOLOGY

## 2024-08-06 PROCEDURE — 76811 OB US DETAILED SNGL FETUS: CPT

## 2024-08-06 PROCEDURE — 99203 OFFICE O/P NEW LOW 30 MIN: CPT | Mod: 25 | Performed by: OBSTETRICS & GYNECOLOGY

## 2024-08-06 NOTE — PROGRESS NOTES
Please see the imaging tab for details of the ultrasound performed today.    Brea Do MD  Specialist in Maternal-Fetal Medicine

## 2024-08-06 NOTE — NURSING NOTE
Darimonalisa Tidwell is a  at 18w1d who presents to [facility] for scheduled L2. Pt reports elevated fasting blood sugars since initiation of monitoring and she is waiting to hear from Endocrine but plans to start insulin with their instruction. SBAR given to Dr. Do, see note in Epic.

## 2024-08-07 ENCOUNTER — TRANSCRIBE ORDERS (OUTPATIENT)
Dept: OTHER | Age: 34
End: 2024-08-07

## 2024-08-07 DIAGNOSIS — O24.419 GESTATIONAL DIABETES MELLITUS (GDM) IN SECOND TRIMESTER, GESTATIONAL DIABETES METHOD OF CONTROL UNSPECIFIED: Primary | ICD-10-CM

## 2024-08-09 ENCOUNTER — MYC MEDICAL ADVICE (OUTPATIENT)
Dept: EDUCATION SERVICES | Facility: CLINIC | Age: 34
End: 2024-08-09

## 2024-08-09 ENCOUNTER — VIRTUAL VISIT (OUTPATIENT)
Dept: EDUCATION SERVICES | Facility: CLINIC | Age: 34
End: 2024-08-09
Payer: COMMERCIAL

## 2024-08-09 ENCOUNTER — TELEPHONE (OUTPATIENT)
Dept: ENDOCRINOLOGY | Facility: CLINIC | Age: 34
End: 2024-08-09

## 2024-08-09 VITALS — BODY MASS INDEX: 30.73 KG/M2 | WEIGHT: 179 LBS

## 2024-08-09 DIAGNOSIS — O24.414 INSULIN CONTROLLED GESTATIONAL DIABETES MELLITUS (GDM) IN SECOND TRIMESTER: Primary | ICD-10-CM

## 2024-08-09 PROCEDURE — G0108 DIAB MANAGE TRN  PER INDIV: HCPCS | Mod: 95

## 2024-08-09 RX ORDER — INSULIN HUMAN 100 [IU]/ML
12 INJECTION, SUSPENSION SUBCUTANEOUS AT BEDTIME
Qty: 15 ML | Refills: 1 | Status: SHIPPED | OUTPATIENT
Start: 2024-08-09 | End: 2024-08-20

## 2024-08-09 NOTE — PROGRESS NOTES
Diabetes Consult Note  August 13, 2024        Dari Tidwell  is a 34 year old female who has no past medical history on file. She is here To establish care for gestational diabetes.  Her obstetric care has been through [facility].  She had an abnormal glucose testing on July 3 and began education 4 times daily blood sugar checking that day.  She was seen on July 15 and all of her fasting blood sugars were above goal though postmeal were improved with diet intervention.  On July 24 fasting readings again continued to be elevated and she was advised to start taking NPH insulin.  She met with dietitian Silke Armas at [facility] and was prescribed NPH 12 units to be taken each evening however she tells me that she has not yet been able to get needles to go with this so has not started using it.    She is now her 18th week of pregnancy.  Estimated due date is December 30, 2024.  Per early first trimester ultrasound.    Dari tells me that this is her second pregnancy.  Her first pregnancy was in 2022 and was unremarkable.  She gave birth to a 6 pound 14 ounce son at 39 weeks and 1 day via spontaneous vaginal delivery.  She is hoping for similar with this pregnancy.  She is adopted and has no known family history of diabetes or otherwise.    She continues to work on diet and check her blood sugars at least 4 times daily.  Details are below.  All but one of her fasting blood sugars is above goal.  Following breakfast and lunch blood sugars are more often in goal, but after dinner some remain above goal and his highs 166 and 167 after eating out.      Currently for breakfast she has 1 piece of toast with avocado and an egg as well as a fruit which may be strawberries or a banana cantaloupe or watermelon.  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  At lunch she might have a salad but more often will eat leftovers from the day prior or they  may order out for lunch and in most cases her blood sugar is generally elevated after lunch.  In the evening they generally have protein such as chicken a carbohydrate like rice, pasta or potato and a vegetable.  She generally does not eat a late night snack.      She reports that she gained 18 pounds in her last pregnancy.  So far this pregnancy she has lost 3 pounds.    She is not particularly active though she is on her feet at work.  She often will try to get out for short bit of exercise after she gets home and gets dinner and her son to bed but most days this does not happen.      Is feeling well and denies any difficulties with vision headache abdominal or pelvic pain, cramping or spotting.  Denies difficulty with swollen feet or ankles.      Current Diabetes Medications:  None yet.  She reports that the NPH is now available for pickup at her pharmacy and she believes the needles are as well.  She was referred to a second pharmacy and they gave her the rest of an open box until the other needles are able to come in.    We reviewed glucometer/CGMS data together.  It revealed:  Detailed below and reviewed above.  She does have 1 fasting blood sugar at goal the others are above goal.  Dinner is more often above goal while breakfast and lunch generally are at goal.    History of DKA: No    History of hospitalizations for diabetes: No    Ability to sense low blood sugars: Presumably intact      Dari's PMH, PSH and allergies were reviewed today and pertinent information is summarized above.  She has no known history of hypertension.  No history of preeclampsia.      Dari's pertinent social and family history are also reviewed today and pertinent information is summarized above.  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  Her 's name is Danilo.      Current Outpatient Medications   Medication Sig Dispense Refill    acetaminophen (TYLENOL)  "325 MG tablet Take 2 tablets (650 mg) by mouth every 4 hours as needed for mild pain or fever (greater than or equal to 38  C /100.4  F (oral) or 38.5  C/ 101.4  F (core).)      docusate sodium (COLACE) 100 MG capsule Take 1 capsule (100 mg) by mouth daily as needed for constipation      erythromycin with ethanoL (EMGEL) 2 % gel [ERYTHROMYCIN WITH ETHANOL (EMGEL) 2 % GEL] Apply to affected skin area topically twice daily for 4-8 weeks. 30 g 0    ibuprofen (ADVIL/MOTRIN) 800 MG tablet Take 1 tablet (800 mg) by mouth every 6 hours as needed for other (cramping)      insulin NPH (HUMULIN N KWIKPEN) 100 UNIT/ML injection Inject 12 Units subcutaneously at bedtime Max TDD 30 units as directed by provider. 15 mL 1    insulin pen needle (32G X 4 MM) 32G X 4 MM miscellaneous Use 1 pen needles daily or as directed. 100 each 2    metroNIDAZOLE (METROGEL) 1 % gel [METRONIDAZOLE (METROGEL) 1 % GEL] Apply topically to affected skin twice daily for 1-2 weeks. 45 g 0    MULTIVITAMIN ORAL [MULTIVITAMIN ORAL] Take by mouth.      polymixin b-trimethoprim (POLYTRIM) 10141-6.1 UNIT/ML-% ophthalmic solution Place 1-2 drops into both eyes every 4 hours 10 mL 0     No current facility-administered medications for this visit.         ROS:   Reports good physical activity tolerance.  Denies any pedal lesions or vision changes or concerns. Denies any other acute concerns except as noted above.      Exam:    LMP 03/25/2024 (Approximate)   0 lbs 0 oz    Reports that her weight was 182 pounds at her 1st OB/GYN appointment this pregnancy.  179 lbs  last weight, within the last week.    Height is 5'4\"     General: Pleasant, well nourished and hydrated female in NAD.   Psych:  Mood is \"good,\" affect is appropriate.  Thought form and content are fluid and coherent.    HEENT: Eyes and sclera are clear. Extraocular movements are grossly intact without proptosis.  Nares are patent, mucous membranes moist.  Neck: No masses or JVD are noted.    Resp: " "Easy and unlabored breathing.   Neuro: Alert and oriented, communicating clearly.   Ext: no swelling or edema.  Good distal pulses and capillary refill in digits.  Skin in good condition.  Sensation is intact to monofilament testing bilaterally and throughout.    Data:      No lab results found.  No results found for: \"CPEPT\", \"GADAB\", \"ISCAB\"  No results found for: \"CHOL\"        Most recent GFRs:    No results found for: \"GFRESTIMATED\", \"GFRESTBLACK\"    No results found for: \"CPEPT\", \"GADAB\", \"ISCAB\"  Computed FIB-4 Calculation unavailable. One or more values for this score either were not found within the given timeframe or did not fit some other criterion.  No results found for: \"AST\"  No results found for: \"ALT\"      Glucose tolerance test revealed a 1 hour glucose at 165.  I cannot identify her A1c in her records and she does not recall a result.      Assessment/Plan:    Dari Tidwell is a 34 year old female with gestational versus type 2 diabetes in second trimester pregnancy.  A good percentage of her blood sugars are above goal and she has been prescribed NPH insulin but has not yet started this.  I cannot find an A1c value from early pregnancy.      Gestational diabetes: At this point we will treat as gestational diabetes and attempt to procure an A1c.  Blood sugars are above goal and she will start NPH 12 units tonight.  She is meeting with the diabetes educator again tomorrow and should continue to do this every 1 to 2 weeks throughout pregnancy.  I will help to see her back again in 4 to 6 weeks and see her with this frequency throughout pregnancy.  We discussed that she will likely need increased insulin throughout her pregnancy.  Discussed the importance of adequate nutrition and goal of 175 high-quality carbohydrates daily.  Reviewed blood glucose goals in pregnancy.  Advised her that if post lunch and/or dinner values remain above goal after starting NPH each evening, I would like her to next start " NPH 4 units each morning.        45 minutes spent on the date of the encounter doing chart review, history and exam, documentation, education and counseling, as well as communication and coordination of care, and further activities as noted above.  This time includes time spent reviewing CGM.      It is my privilege to be involved in the care of the above patient.     Lulu Lara PA-C, MPAS  [facility]  Diabetes, Endocrinology, and Metabolism  [phone] Appointments/Nurse  [phone] pager  [phone] nurse line    This note was completed in part using Dragon voice recognition, and may contain word and grammatical errors.        Virtual Visit Details    Type of service:  Video Visit     Originating Location (pt. Location): work    Distant Location (provider location):  Off-site  Platform used for Video Visit: Tex    Outcome for 08/09/24 9:21 AM: [employer] message sent  Liv Chandler MA  Outcome for 08/12/24 6:58 AM: Glucose readings sent via [employer]  Liv Chandler MA

## 2024-08-09 NOTE — Clinical Note
Lulu,  Pt was scheduled with you. Please let us know if you would like her her to be scheduled in the pregnancy clinic with Benigno SHANE in the future or if you are comfortable managing with our team. I just want to try to make this all easy for the pt to follow.   Thanks!  Enriqueta Gomez RN, [facility]

## 2024-08-09 NOTE — PROGRESS NOTES
Diabetes Self-Management Education & Support  Type of service:  Video Visit    If the video visit is dropped, the video visit invitation should be resent by: Text to cell phone: [phone]    Originating Location (pt. Location): Other Work  Distant Location (provider location): Offsite  Mode of Communication:  Video Conference via [place] [employer]    Video Start Time:  9:45am  Video End Time (time video stopped): 10:30am    How would patient like to obtain AVS? MyChart    SUBJECTIVE/OBJECTIVE:  Presents for education related to gestational diabetes.    Accompanied by: Self  Gestational weeks: 18  [facility] planned [facility] delivery: [facility]  Number of previous pregnancies: 1  Had any babies over 9 lbs: No  Previously had Gestational Diabetes: No  Have you ever had thyroid problems or taken thyroid medication?: No  Heart disease, mitral valve prolapse or rheumatic fever?: No  Hypertension : No  High Cholesterol: No  High Triglycerides: No  Do you use tobacco products?: No  Do you drink beer, wine or hard liquor?: No    Cultural Influences/Ethnic Background:  Not  or       LMP 2024 (Approximate)         Estimated Date of Delivery: 2025    Blood Glucose/Ketone Log:   Date  Ketones FBG 1 hours post Breakfast 1 hour post lunch    1 hours post dinner   8-4 *odd day  100  166    8-5  115 137  119   8-6  113 111  102   8-7  106 123  118   8-8   106 132  163 *soy sauce   8-9  102 119     *Usually misses lunch    Lifestyle and Health Behaviors:  Barrier to exercise: Time  Cultural/Zoroastrian diet restrictions?: No  Meal planning/habits: Avoiding sweets, Low carb, Smaller portions  How many times a week on average do you eat food made away from home (restaurant/take-out)?: 2  Meals include: Breakfast, Lunch, Dinner  Beverages: Water  How many servings of fruits/vegetables per day: 1  Biggest challenges to healthy eating: Finances, Lack of time  Pre- vitamin?: Yes  Supplements?: No  Experiencing nausea?:  No  Experiencing heartburn?: No    Healthy Coping:  Emotional response to diabetes: Ready to learn, Confidence diabetes can be controlled, Concern for health and well-being  Informal Support system:: Family, Friends, Spouse  Stage of change: ACTION (Actively working towards change)    Current Management:  Taking medications for gestational diabetes?: No    ASSESSMENT:  Ketones: not testing.   Fasting blood glucoses: 0% in target.  After breakfast: 100% in target.  After lunch: 0% in target.(Only 1 test on a day that was not her usual diet)  After dinner: 100% in target.    I met with Dari via video for an insulin start today.  She was referred from [facility] to start insulin for her high fasting blood sugars.  She did complete initial gestational diabetes education with [facility].  We also discussed urine ketone testing as she was not previously taught this.  She is currently not eating at bedtime snack, and we discussed considering the addition of one to help with ketones if needed.  Her fasting blood glucose levels are elevated enough that she does need to start insulin at this time.  She would normally be referred to the diabetes and pregnancy clinic with Yazmin Hogue NP, but due to the endocrinology referral that was placed by her OB/GYN she was scheduled with Lulu Lara and has an appointment with her next week.  In order to prevent any confusion I have left this appointment as scheduled, as well as a follow-up with our diabetes education team the day after.  I did let her know that usually the Endo provider will see her every month and we will [facility] at [facility] to help with dose adjustments.  I did send the insulin Rx in a separate telephone encounter to Lulu Lara for approval based on our GDM insulin start protocol.    INTERVENTION:  Educational topics covered today:  Insulin instructions for NPH insulin pens. What to expect after delivery, Future  testing for Type 2 diabetes (2 hour OGTT at 6 week post-partum check-up and annual fasting blood glucose level), Risk of GDM and planning ahead for future pregnancies, Recommended lifestyle interventions for reducing the risk of Type 2 Diabetes, When to Call a Diabetes Educator or OB Provider    Educational Materials provided today:  Goldie Preventing Diabetes    PLAN:  Start 12 units (0.15 units/kg) of NPH at bedtime.  Check glucose 3-4 times daily.  Check ketones once a week when readings are consistently negative.  Continue with recommended physical activity.  Continue to follow recommended meal plan: 30-45 carbs at breakfast, 45-60 carbs at lunch, 45-60 carbs at supper, 15-30 carbs at snacks.  Follow consistent CHO meal plan, eat CHO and protein/fat at all meals/snacks.    Call/e-mail/MyChart message diabetes educator if 3 or more blood sugars are above the goal in 1 week or if ketones are positive.    Enriqueta Gomez RN, [facility]    Time Spent: 45 minutes  Encounter Type: Individual    Any diabetes medication dose changes were made via the CDE Protocol and Collaborative Practice Agreement with the patient's referring provider, endocrinology provider, and OB/GYN provider. A copy of this encounter was shared with the provider.

## 2024-08-09 NOTE — PATIENT INSTRUCTIONS
"Taking Insulin:    1. Take NPH (Humulin-N or Novolin-N) - 12 units at bedtime.     - Rotate injection sites, keeping at least 1 inch apart from last injection site and 2 inches away from belly button or surgical scars.    -If you have a cloudy insulin, mix the insulin gently by rolling between your hands 10 times and turning upside down and right side up 10 times. Do not shake.     2. Pen - Use a new pen needle for each injection. Always remove pen needle from the insulin pen after use and do not store insulin pens with the needle on the pen. Do a 2 unit \"prime\" before each injection, be sure a drop or stream of insulin comes out of the needle before you give your injection. After you inject, hold the needle under the skin to the count of 10 to be sure all of the insulin goes in.      3. Store insulin you are not using in the refrigerator (do not freeze). Take new insulin out of the refrigerator a few hours prior to use to bring to room temperature.     4. Once opened NPH Humulin N Jasson can be kept at room temperature for 14 days. Do not use the opened insulin after this time has passed, even if there is still medicine inside.     5. Always carry your blood sugar meter and a sugar source like glucose tablets with you in case of a low blood sugar. Treat a low blood sugar (less than 70) with 15 grams of carbohydrate (1 carb choice). Wait 15 minutes, recheck blood sugar. If blood sugar is still below 70, repeat the treatment.    6. Call your doctor or diabetes educator if you begin having low blood sugars or if you have questions or concerns.     7. Follow-up: Endocrinology on 8/13, Diabetes Education on 8/14.  "

## 2024-08-09 NOTE — LETTER
8/9/2024         RE: Dari Tidwell  [address] [facility] [address]        Dear Colleague,    Thank you for referring your patient, Dari Tidwell, to the [facility]. Please see a copy of my visit note below.    Diabetes Self-Management Education & Support  Type of service:  Video Visit    If the video visit is dropped, the video visit invitation should be resent by: Text to cell phone: [phone]    Originating Location (pt. Location): Other Work  Distant Location (provider location): Offsite  Mode of Communication:  Video Conference via [employer]    Video Start Time:  9:45am  Video End Time (time video stopped): 10:30am    How would patient like to obtain AVS? MyChart    SUBJECTIVE/OBJECTIVE:  Presents for education related to gestational diabetes.    Accompanied by: Self  Gestational weeks: 18  [facility] planned for delivery: [facility]  Number of previous pregnancies: 1  Had any babies over 9 lbs: No  Previously had Gestational Diabetes: No  Have you ever had thyroid problems or taken thyroid medication?: No  Heart disease, mitral valve prolapse or rheumatic fever?: No  Hypertension : No  High Cholesterol: No  High Triglycerides: No  Do you use tobacco products?: No  Do you drink beer, wine or hard liquor?: No    Cultural Influences/Ethnic Background:  Not  or       LMP 03/25/2024 (Approximate)         Estimated Date of Delivery: Jan 6, 2025    Blood Glucose/Ketone Log:   Date  Ketones FBG 1 hours post Breakfast 1 hour post lunch    1 hours post dinner   8-4 *odd day  100  166    8-5  115 137  119   8-6  113 111  102   8-7  106 123  118   8-8   106 132  163 *soy sauce   8-9  102 119     *Usually misses lunch    Lifestyle and Health Behaviors:  Barrier to exercise: Time  Cultural/Sikhism diet restrictions?: No  Meal planning/habits: Avoiding sweets, Low carb, Smaller portions  How many times a week on average do you eat food made away from home  (restaurant/take-out)?: 2  Meals include: Breakfast, Lunch, Dinner  Beverages: Water  How many servings of fruits/vegetables per day: 1  Biggest challenges to healthy eating: Finances, Lack of time  Pre- vitamin?: Yes  Supplements?: No  Experiencing nausea?: No  Experiencing heartburn?: No    Healthy Coping:  Emotional response to diabetes: Ready to learn, Confidence diabetes can be controlled, Concern for health and well-being  Informal Support system:: Family, Friends, Spouse  Stage of change: ACTION (Actively working towards change)    Current Management:  Taking medications for gestational diabetes?: No    ASSESSMENT:  Ketones: not testing.   Fasting blood glucoses: 0% in target.  After breakfast: 100% in target.  After lunch: 0% in target.(Only 1 test on a day that was not her usual diet)  After dinner: 100% in target.    I met with Dari via video for an insulin start today.  She was referred from [facility] to start insulin for her high fasting blood sugars.  She did complete initial gestational diabetes education with [facility].  We also discussed urine ketone testing as she was not previously taught this.  She is currently not eating at bedtime snack, and we discussed considering the addition of one to help with ketones if needed.  Her fasting blood glucose levels are elevated enough that she does need to start insulin at this time.  She would normally be referred to the diabetes and pregnancy clinic with Yazmin Hogue NP, but due to the endocrinology referral that was placed by her OB/GYN she was scheduled with Lulu Lara and has an appointment with her next week.  In order to prevent any confusion I have left this appointment as scheduled, as well as a follow-up with our diabetes education team the day after.  I did let her know that usually the Endo provider will see her every month and we will [facility] at least [facility] to help with dose adjustments.   I did send the insulin Rx in a separate telephone encounter to Lulu Lara for approval based on our GDM insulin start protocol.    INTERVENTION:  Educational topics covered today:  Insulin instructions for NPH insulin pens. What to expect after delivery, Future testing for Type 2 diabetes (2 hour OGTT at 6 week post-partum check-up and annual fasting blood glucose level), Risk of GDM and planning ahead for future pregnancies, Recommended lifestyle interventions for reducing the risk of Type 2 Diabetes, When to Call a Diabetes Educator or OB Provider    Educational Materials provided today:  Goldie Preventing Diabetes    PLAN:  Start 12 units (0.15 units/kg) of NPH at bedtime.  Check glucose 3-4 times daily.  Check ketones once a week when readings are consistently negative.  Continue with recommended physical activity.  Continue to follow recommended meal plan: 30-45 carbs at breakfast, 45-60 carbs at lunch, 45-60 carbs at supper, 15-30 carbs at snacks.  Follow consistent CHO meal plan, eat CHO and protein/fat at all meals/snacks.    Call/e-mail/[employer] message diabetes educator if 3 or more blood sugars are above the goal in 1 week or if ketones are positive.    Enriqueta Gomez RN, [facility]    Time Spent: 45 minutes  Encounter Type: Individual    Any diabetes medication dose changes were made via the CDE Protocol and Collaborative Practice Agreement with the patient's referring provider, endocrinology provider, and OB/GYN provider. A copy of this encounter was shared with the provider.

## 2024-08-09 NOTE — TELEPHONE ENCOUNTER
Left Voicemail (1st Attempt) for the patient to call back and schedule the following:    Appointment type: New Diabetes   Provider: Any provider that sees new diabetes pts   Return date: within 1-2 weeks   Specialty phone number: [phone]  Additional appointment(s) needed: NA   Additonal Notes: Esperanza MARTINEZ Deanne M, RN  [facility]  Please help schedule per GDM protocol.    Examples:  8/13 [facility] (2 back to back return MEMO slots)  8/27 [facility] virtual [facility]   8/28 [facility]   [facility]    * Check to see if there are sooner visits since pts cancel often. If there are no sooner visits and the options above are scheduled or do not work for pt please schedule next avail New MEMO/MEMO on-call or 2 back-to-back return MEMO slots at [facility] or  only. Thank you. *    Please note that the above appointment(s) will require manual scheduling as they are marked as MEMO and will not appear using auto search. Do not schedule the patient if another patient has already been scheduled in the requested appointment slot.     Malini Fournier on 8/9/2024 at 8:14 AM

## 2024-08-09 NOTE — TELEPHONE ENCOUNTER
Date  Ketones FBG 1 hours post Breakfast 1 hour post lunch    1 hours post dinner   8-4 *odd day  100  166    8-5  115 137  119   8-6  113 111  102   8-7  106 123  118   8-8   106 132  163 *soy sauce   8-9  102 119     *Not able to do BG testing after lunch due to job.    Lulu,    I met with pt virtually today to start insulin. She had her first diabetes ed visits with a dietitian at [facility]) and then was referred to Endo and Diabetes Education for the need to start insulin due to elevated fasting BG's. Pt is scheduled to see you next week, so I pended the insulin order for her based on our protocol (0.1-0.2 units/kg). Please sign pended orders if in agreement.    Usually these pts end up seeing Nelly Hogue NP, but since she is already scheduled with you I didn't want to add any confusion. I did let pt know that she will [facility] weekly and may be following up with you monthly most likely.     Thanks!    Enriqueta Gomez RN, [facility]

## 2024-08-12 NOTE — CONFIDENTIAL NOTE
RECORDS RECEIVED FROM: internal    DATE RECEIVED: 8/13/24    NOTES (FOR ALL VISITS) STATUS DETAILS   OFFICE NOTES from referring provider internal    Jennifer Scherer MD      OFFICE NOTES from other specialist internal  8.9.24 Patricia   8.6.24 Hi     Received   7.24.24, 6.26.24, 6.10.24, 6.4.24 [facility]    MEDICATION LIST internal     LABS     DIABETES: HBGA1C, CREATININE, FASTING LIPIDS, MICROALBUMIN URINE, POTASSIUM, TSH, T4    THYROID: TSH, T4, CBC, THYRODLONULIN, TOTAL T3, FREE T4, CALCITONIN, CEA internal  Cbc- 6.26.24  Received labs- 6.5.24             
21-Mar-2017 14:03

## 2024-08-13 ENCOUNTER — PRE VISIT (OUTPATIENT)
Dept: ENDOCRINOLOGY | Facility: CLINIC | Age: 34
End: 2024-08-13

## 2024-08-13 ENCOUNTER — VIRTUAL VISIT (OUTPATIENT)
Dept: ENDOCRINOLOGY | Facility: CLINIC | Age: 34
End: 2024-08-13
Payer: COMMERCIAL

## 2024-08-13 DIAGNOSIS — O24.419 GESTATIONAL DIABETES MELLITUS (GDM) IN SECOND TRIMESTER, GESTATIONAL DIABETES METHOD OF CONTROL UNSPECIFIED: ICD-10-CM

## 2024-08-13 PROCEDURE — 99215 OFFICE O/P EST HI 40 MIN: CPT | Mod: 95 | Performed by: PHYSICIAN ASSISTANT

## 2024-08-13 NOTE — PATIENT INSTRUCTIONS
Dear Dari,  Congratulations to you and your family!    It was so nice to meet with you!  Please get and start the insulin and checking ketones tonight.  Please focus on eating a balanced, high quality diet with healthy, higher fiber carbohydrates such as whole grains, beans, lentils, fruit and vegetables.  Goal is 175 g of high quality carbohydrates each day. Please also be sure to get some physical activity everyday.    Please start NPH insulin 12 units each night tonight.  If blood sugars after dinner are still >140, please add 4 units NPH when you awake as long as this does not lead to low blood glucose <70 during the day (let us know if so!)    Blood glucose goals during pregnancy    Fasting blood sugar less than 95  Blood sugar 1 hour after meals less than 140 OR  Blood sugar 2-hour after meals less than 120  And A1c less than or equal to 6.0.      My best wishes,    Lulu Lara PA-C, MPAS  [facility]  Diabetes, Endocrinology, and Metabolism  [phone] Appointments/Nurse  [phone] Fax

## 2024-08-13 NOTE — LETTER
8/13/2024       RE: Dari Tidwell  [address]  [place] [address]     Dear Colleague,    Thank you for referring your patient, Dari Tidwell, to the [facility] [place] at [facility]. Please see a copy of my visit note below.             Diabetes Consult Note  August 13, 2024        Dari Tidwell  is a 34 year old female who has no past medical history on file. She is here To establish care for gestational diabetes.  Her obstetric care has been through [facility].  She had an abnormal glucose testing on July 3 and began education 4 times daily blood sugar checking that day.  She was seen on July 15 and all of her fasting blood sugars were above goal though postmeal were improved with diet intervention.  On July 24 fasting readings again continued to be elevated and she was advised to start taking NPH insulin.  She met with dietitian Silke Armas at [facility] and was prescribed NPH 12 units to be taken each evening however she tells me that she has not yet been able to get needles to go with this so has not started using it.    She is now her 18th week of pregnancy.  Estimated due date is December 30, 2024.  Per early first trimester ultrasound.    Dari tells me that this is her second pregnancy.  Her first pregnancy was in 2022 and was unremarkable.  She gave birth to a 6 pound 14 ounce son at 39 weeks and 1 day via spontaneous vaginal delivery.  She is hoping for similar with this pregnancy.  She is adopted and has no known family history of diabetes or otherwise.    She continues to work on diet and check her blood sugars at least 4 times daily.  Details are below.  All but one of her fasting blood sugars is above goal.  Following breakfast and lunch blood sugars are more often in goal, but after dinner some remain above goal and his highs 166 and 167 after eating out.      Currently for breakfast she has 1 piece of  toast with avocado and an egg as well as a fruit which may be strawberries or a banana cantaloupe or watermelon.  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  At lunch she might have a salad but more often will eat leftovers from the day prior or they may order out for lunch and in most cases her blood sugar is generally elevated after lunch.  In the evening they generally have protein such as chicken a carbohydrate like rice, pasta or potato and a vegetable.  She generally does not eat a late night snack.      She reports that she gained 18 pounds in her last pregnancy.  So far this pregnancy she has lost 3 pounds.    She is not particularly active though she is on her feet at work.  She often will try to get out for short bit of exercise after she gets home and gets dinner and her son to bed but most days this does not happen.      Is feeling well and denies any difficulties with vision headache abdominal or pelvic pain, cramping or spotting.  Denies difficulty with swollen feet or ankles.      Current Diabetes Medications:  None yet.  She reports that the NPH is now available for pickup at her pharmacy and she believes the needles are as well.  She was referred to a second pharmacy and they gave her the rest of an open box until the other needles are able to come in.    We reviewed glucometer/CGMS data together.  It revealed:  Detailed below and reviewed above.  She does have 1 fasting blood sugar at goal the others are above goal.  Dinner is more often above goal while breakfast and lunch generally are at goal.    History of DKA: No    History of hospitalizations for diabetes: No    Ability to sense low blood sugars: Presumably intact      Dari's PMH, PSH and allergies were reviewed today and pertinent information is summarized above.  She has no known history of hypertension.  No history of preeclampsia.      Dari's pertinent social and  "family history are also reviewed today and pertinent information is summarized above.  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  Her 's name is Danilo.      Current Outpatient Medications   Medication Sig Dispense Refill     acetaminophen (TYLENOL) 325 MG tablet Take 2 tablets (650 mg) by mouth every 4 hours as needed for mild pain or fever (greater than or equal to 38  C /100.4  F (oral) or 38.5  C/ 101.4  F (core).)       docusate sodium (COLACE) 100 MG capsule Take 1 capsule (100 mg) by mouth daily as needed for constipation       erythromycin with ethanoL (EMGEL) 2 % gel [ERYTHROMYCIN WITH ETHANOL (EMGEL) 2 % GEL] Apply to affected skin area topically twice daily for 4-8 weeks. 30 g 0     ibuprofen (ADVIL/MOTRIN) 800 MG tablet Take 1 tablet (800 mg) by mouth every 6 hours as needed for other (cramping)       insulin NPH (HUMULIN N KWIKPEN) 100 UNIT/ML injection Inject 12 Units subcutaneously at bedtime Max TDD 30 units as directed by provider. 15 mL 1     insulin pen needle (32G X 4 MM) 32G X 4 MM miscellaneous Use 1 pen needles daily or as directed. 100 each 2     metroNIDAZOLE (METROGEL) 1 % gel [METRONIDAZOLE (METROGEL) 1 % GEL] Apply topically to affected skin twice daily for 1-2 weeks. 45 g 0     MULTIVITAMIN ORAL [MULTIVITAMIN ORAL] Take by mouth.       polymixin b-trimethoprim (POLYTRIM) 22044-4.1 UNIT/ML-% ophthalmic solution Place 1-2 drops into both eyes every 4 hours 10 mL 0     No current facility-administered medications for this visit.         ROS:   Reports good physical activity tolerance.  Denies any pedal lesions or vision changes or concerns. Denies any other acute concerns except as noted above.      Exam:    LMP 03/25/2024 (Approximate)   0 lbs 0 oz    Reports that her weight was 182 pounds at her 1st OB/GYN appointment this pregnancy.  179 lbs  last weight, within the last week.    Height is 5'4\"     General: " "Pleasant, well nourished and hydrated female in NAD.   Psych:  Mood is \"good,\" affect is appropriate.  Thought form and content are fluid and coherent.    HEENT: Eyes and sclera are clear. Extraocular movements are grossly intact without proptosis.  Nares are patent, mucous membranes moist.  Neck: No masses or JVD are noted.    Resp: Easy and unlabored breathing.   Neuro: Alert and oriented, communicating clearly.   Ext: no swelling or edema.  Good distal pulses and capillary refill in digits.  Skin in good condition.  Sensation is intact to monofilament testing bilaterally and throughout.    Data:      No lab results found.  No results found for: \"CPEPT\", \"GADAB\", \"ISCAB\"  No results found for: \"CHOL\"        Most recent GFRs:    No results found for: \"GFRESTIMATED\", \"GFRESTBLACK\"    No results found for: \"CPEPT\", \"GADAB\", \"ISCAB\"  Computed FIB-4 Calculation unavailable. One or more values for this score either were not found within the given timeframe or did not fit some other criterion.  No results found for: \"AST\"  No results found for: \"ALT\"      Glucose tolerance test revealed a 1 hour glucose at 165.  I cannot identify her A1c in her records and she does not recall a result.      Assessment/Plan:    Dari Tidwell is a 34 year old female with gestational versus type 2 diabetes in second trimester pregnancy.  A good percentage of her blood sugars are above goal and she has been prescribed NPH insulin but has not yet started this.  I cannot find an A1c value from early pregnancy.      Gestational diabetes: At this point we will treat as gestational diabetes and attempt to procure an A1c.  Blood sugars are above goal and she will start NPH 12 units tonight.  She is meeting with the diabetes educator again tomorrow and should continue to do this every 1 to 2 weeks throughout pregnancy.  I will help to see her back again in 4 to 6 weeks and see her with this frequency throughout pregnancy.  We discussed that she " will likely need increased insulin throughout her pregnancy.  Discussed the importance of adequate nutrition and goal of 175 high-quality carbohydrates daily.  Reviewed blood glucose goals in pregnancy.  Advised her that if post lunch and/or dinner values remain above goal after starting NPH each evening, I would like her to next start NPH 4 units each morning.        45 minutes spent on the date of the encounter doing chart review, history and exam, documentation, education and counseling, as well as communication and coordination of care, and further activities as noted above.  This time includes time spent reviewing CGM.      It is my privilege to be involved in the care of the above patient.     Lulu Lara PA-C, MPAS  [facility]  Diabetes, Endocrinology, and Metabolism  [phone] Appointments/Nurse  [phone] pager  [phone] nurse line    This note was completed in part using Dragon voice recognition, and may contain word and grammatical errors.        Virtual Visit Details    Type of service:  Video Visit     Originating Location (pt. Location): work    Distant Location (provider location):  Off-site  Platform used for Video Visit: Tex    Outcome for 08/09/24 9:21 AM: [employer] message sent  Liv Chandler MA  Outcome for 08/12/24 6:58 AM: Glucose readings sent via [employer] Chandler MA              Again, thank you for allowing me to participate in the care of your patient.      Sincerely,    Lulu Lara PA-C

## 2024-08-13 NOTE — NURSING NOTE
Current patient location:  [place]     Is the patient currently in the state of [place]? YES    Visit mode:VIDEO    If the visit is dropped, the patient can be reconnected by: VIDEO VISIT: Text to cell phone:   Telephone Information:   Mobile [phone]       Will anyone else be joining the visit? NO  (If patient encounters technical issues they should call [phone])    How would you like to obtain your AVS? MyChart    Are changes needed to the allergy or medication list? Yes pt     Are refills needed on medications prescribed by this physician? New patient     Rooming Documentation:  Not applicable      Reason for visit: Consult (GDM in second trimester)    Susie STANLEY

## 2024-08-14 ENCOUNTER — TELEPHONE (OUTPATIENT)
Dept: ENDOCRINOLOGY | Facility: CLINIC | Age: 34
End: 2024-08-14

## 2024-08-14 ENCOUNTER — VIRTUAL VISIT (OUTPATIENT)
Dept: EDUCATION SERVICES | Facility: OTHER | Age: 34
End: 2024-08-14
Payer: COMMERCIAL

## 2024-08-14 DIAGNOSIS — O24.419 GESTATIONAL DIABETES MELLITUS (GDM) IN SECOND TRIMESTER, GESTATIONAL DIABETES METHOD OF CONTROL UNSPECIFIED: ICD-10-CM

## 2024-08-14 NOTE — PATIENT INSTRUCTIONS
Continue taking Nph 12 units daily at bedtime.  Send us a message in [employer] Friday morning with your ketone results and blood sugars.    Make your follow up appt with Sylvia Lara in endocrinology for 4 to 6 weeks from now.    Blood glucose testing:   Check glucose 4 times daily, before breakfast and 1 hour after each meal.  If you miss the 1 hour after meal reading, get a 2 hour after meal reading.  Set a timer when you start eating your meals to check your blood glucose in 1 hour.  Record your readings in your log book.     2. Ketone testing:  Check Ketones daily.  When you have 7 negative tests in a row, you can decrease testing to once weekly.  Continue to record your results with your blood sugars.     3. Physical activity recommendations:  Physical activity recommended: 10-15 min walk after meals.     4.  Diabetes in Pregnancy Meal Plan:  2-3 carb choices at breakfast, 3-4 carb choices at lunch, 3-4 carb choices at supper, 1-2 carb choices at 3 snacks a day- in between meals and at bedtime.      1 carb choice = 15 grams of carbohydrate.    Follow the consistent carbohydrate meal plan as above, and eat a combination of carbohydrate/protein/fat at all meals and snacks.         5.  BG testing goals:  Morning fasting BG goal is less than or = to 95  1 hour post meal BG goal is less than or = to 140  2 hour post meal BG goal is less than or = to 120        *Call/[employer] message diabetes educator if 3 or more blood sugars are above the goal in 1 week, if ketones are positive, or with any questions/concerns.      Lorna Dutton, PharmD, [facility] and [place] [facility]    [place] Diabetes Education and Nutrition Services for the [facility]:  For Your Diabetes Education and Nutrition Appointments Call:  [phone]   For Diabetes Education or Nutrition Related Questions:   Phone: [phone]  OR  Send [employer] Message       If you need a medication refill please contact your pharmacy.  Please allow 3 business days for your refills to be completed.

## 2024-08-14 NOTE — PROGRESS NOTES
Diabetes Self-Management Education & Support  Type of service:  Video Visit    If the video visit is dropped, the video visit invitation should be resent by: Text to cell phone: [phone]    Originating Location (pt. Location): Work  Distant Location (provider location): [facility]  Mode of Communication:  Video Conference via [employer]    Video Start Time:  802am  Video End Time (time video stopped): 817    How would patient like to obtain AVS? MyChart    SUBJECTIVE/OBJECTIVE:  Presents for education related to gestational diabetes.  She is here for follow up after initial education with Enriqueta last week.  See GDM comments for management notes for Dari.      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    Current Management:  Nph 12 units daily at bedtime, started last night as had issues at pharmacy getting pen needles.  Diet and exercise    ASSESSMENT:  Ketones: neg x 1.   Fasting blood glucoses: 0% in target.  After breakfast: 100% in target.  After lunch: 100% in target.  After dinner: 100% in target.    Will keep on Nph 12 units and reassess Friday.  Will send in [employer] message.    INTERVENTION:  Educational topics covered today:  What to expect after delivery, Future testing for Type 2 diabetes (2 hour OGTT at 6 week post-partum check-up and annual fasting blood glucose level), Risk of GDM and planning ahead for future pregnancies, Recommended lifestyle interventions for reducing the risk of Type 2 Diabetes, When to Call a Diabetes Educator or OB  Provider    Educational Materials provided today:  [place] Preventing Diabetes - mailed    PLAN:    Continue taking Nph 12 units daily at bedtime.  Send us a message in [email or web address] Friday morning with your ketone results and blood sugars.    Make your follow up appt with Sylvia Lara in endocrinology for 4 to 6 weeks from now.    Blood glucose testing:   Check glucose 4 times daily, before breakfast and 1 hour after each meal.  If you miss the 1 hour after meal reading, get a 2 hour after meal reading.  Set a timer when you start eating your meals to check your blood glucose in 1 hour.  Record your readings in your log book.     2. Ketone testing:  Check Ketones daily.  When you have 7 negative tests in a row, you can decrease testing to once weekly.  Continue to record your results with your blood sugars.     3. Physical activity recommendations:  Physical activity recommended: 10-15 min walk after meals.     4.  Diabetes in Pregnancy Meal Plan:  2-3 carb choices at breakfast, 3-4 carb choices at lunch, 3-4 carb choices at supper, 1-2 carb choices at 3 snacks a day- in between meals and at bedtime.      1 carb choice = 15 grams of carbohydrate.    Follow the consistent carbohydrate meal plan as above, and eat a combination of carbohydrate/protein/fat at all meals and snacks.         5.  BG testing goals:  Morning fasting BG goal is less than or = to 95  1 hour post meal BG goal is less than or = to 140  2 hour post meal BG goal is less than or = to 120        *[email or web address] message diabetes educator if 3 or more blood sugars are above the goal in 1 week, if ketones are positive, or with any questions/concerns.      Lorna Dutton, PharmD, [facility] and [place] [facility]    [place] Diabetes Education and Nutrition Services for the [facility]:  For Your Diabetes Education and Nutrition Appointments Call:  [phone]   For Diabetes Education or Nutrition Related Questions:   Phone:  [phone]  OR  Send [employer] Message       If you need a medication refill please contact your pharmacy. Please allow 3 business days for your refills to be completed.      Lorna Dutton, PharmD, [facility], [facility] and [place] Diabetes Education    Time Spent: 15 minutes  Encounter Type: Individual    Any diabetes medication dose changes were made via the CDE Protocol and Collaborative Practice Agreement with the patient's referring provider. A copy of this encounter was shared with the provider.

## 2024-08-14 NOTE — LETTER
8/14/2024         RE: Dari Tidwell  [address]  [place] [address]        Dear Colleague,    Thank you for referring your patient, Dari Tidwell, to the [facility]. Please see a copy of my visit note below.    Diabetes Self-Management Education & Support  Type of service:  Video Visit    If the video visit is dropped, the video visit invitation should be resent by: Text to cell phone: [phone]    Originating Location (pt. Location): Work  Distant Location (provider location): [facility]  Mode of Communication:  Video Conference via [employer]    Video Start Time:  802am  Video End Time (time video stopped): 817    How would patient like to obtain AVS? MyChart    SUBJECTIVE/OBJECTIVE:  Presents for education related to gestational diabetes.  She is here for follow up after initial education with Enriqueta last week.  See GDM comments for management notes for Dari.      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    Current Management:  Nph 12 units daily at bedtime, started last night as had issues at pharmacy getting pen needles.  Diet and exercise    ASSESSMENT:  Ketones: neg x 1.   Fasting blood glucoses: 0% in target.  After breakfast: 100% in target.  After lunch: 100% in target.  After dinner: 100% in target.    Will keep on Nph 12 units and reassess Friday.  Will send in [employer] message.    INTERVENTION:  Educational topics covered today:  What to expect after delivery, Future testing for Type 2 diabetes (2 hour OGTT at 6 week  post-partum check-up and annual fasting blood glucose level), Risk of GDM and planning ahead for future pregnancies, Recommended lifestyle interventions for reducing the risk of Type 2 Diabetes, When to Call a Diabetes Educator or OB Provider    Educational Materials provided today:  [employer] Preventing Diabetes - mailed    PLAN:    Continue taking Nph 12 units daily at bedtime.  Send us a message in [employer] Friday morning with your ketone results and blood sugars.    Make your follow up appt with Sylvia Lara in endocrinology for 4 to 6 weeks from now.    Blood glucose testing:   Check glucose 4 times daily, before breakfast and 1 hour after each meal.  If you miss the 1 hour after meal reading, get a 2 hour after meal reading.  Set a timer when you start eating your meals to check your blood glucose in 1 hour.  Record your readings in your log book.     2. Ketone testing:  Check Ketones daily.  When you have 7 negative tests in a row, you can decrease testing to once weekly.  Continue to record your results with your blood sugars.     3. Physical activity recommendations:  Physical activity recommended: 10-15 min walk after meals.     4.  Diabetes in Pregnancy Meal Plan:  2-3 carb choices at breakfast, 3-4 carb choices at lunch, 3-4 carb choices at supper, 1-2 carb choices at 3 snacks a day- in between meals and at bedtime.      1 carb choice = 15 grams of carbohydrate.    Follow the consistent carbohydrate meal plan as above, and eat a combination of carbohydrate/protein/fat at all meals and snacks.         5.  BG testing goals:  Morning fasting BG goal is less than or = to 95  1 hour post meal BG goal is less than or = to 140  2 hour post meal BG goal is less than or = to 120        *Call/[employer] message diabetes educator if 3 or more blood sugars are above the goal in 1 week, if ketones are positive, or with any questions/concerns.      Lorna Dutton, PharmD, [facility] and [place] [facility]  Education    [place] [facility] for the [facility]:  For Your Diabetes Education and Nutrition Appointments Call:  [phone]   For Diabetes Education or Nutrition Related Questions:   Phone: [phone]  OR  Send [employer] Message       If you need a medication refill please contact your pharmacy. Please allow 3 business days for your refills to be completed.      Lorna Dutton, PharmD, [facility], [facility] and [place] [facility]    Time Spent: 15 minutes  Encounter Type: Individual    Any diabetes medication dose changes were made via the CDE Protocol and Collaborative Practice Agreement with the patient's referring provider. A copy of this encounter was shared with the provider.

## 2024-08-14 NOTE — TELEPHONE ENCOUNTER
Left Voicemail (1st Attempt) for the patient to call back and schedule the following:    Appointment type: return GDM  Provider: Jane   Return date: follow-up every 5-7 weeks starting on or near 9/24 until the end of Pt's pregnancy   Specialty phone number: [phone]  Additional appointment(s) needed: NA   Additonal Notes: LVM, MyC x1   Jane, Lulu CORDOVA, Enriqueta Sargent, KARELY; Mercedes Hernandez RN; Lorna Dutton [facility]; [id number]  Please schedule patient with GDM every 5-7 weeks throughout pregnancy with me    Examples:  9/24 Jane virtual csc  10/8 Jane virtual csc  10/22 Jane virtual csc     Please note that the above appointment(s) will require manual scheduling as they are marked as MEMO and will not appear using auto search. Do not schedule the patient if another patient has already been scheduled in the requested appointment slot.     Malini Fournier on 8/14/2024 at 11:13 AM

## 2024-08-16 ENCOUNTER — TELEPHONE (OUTPATIENT)
Dept: EDUCATION SERVICES | Facility: CLINIC | Age: 34
End: 2024-08-16
Payer: COMMERCIAL

## 2024-08-16 DIAGNOSIS — O24.419 GESTATIONAL DIABETES MELLITUS (GDM) IN SECOND TRIMESTER, GESTATIONAL DIABETES METHOD OF CONTROL UNSPECIFIED: Primary | ICD-10-CM

## 2024-08-16 NOTE — TELEPHONE ENCOUNTER
Requesting order for diabetes education so insulin may be adjusted per standing orders.  IF in agreement, please place referral.   Andreina Garduno, MPH, RD, CDCES, LD 8/16/2024

## 2024-08-16 NOTE — TELEPHONE ENCOUNTER
Patient confirmed scheduled appointment:  Date: 10/8, 12/10   Time: 12, 8   Visit type: return gdm   Provider: Jane   Location: [facility] [place]  Testing/imaging: NA   Additional notes: Spoke to pt and scheduled per below message. There was no availability in Sept.   Lulu Lara, Enriqueta Sargent, KARELY; Mercedes Hernandez RN; Lorna Dutton, [facility]; [id number]  Please schedule patient with GDM every 5-7 weeks throughout pregnancy with moe Fournier on 8/16/2024 at 9:09 AM

## 2024-08-16 NOTE — TELEPHONE ENCOUNTER
Gestational Diabetes Follow-up    Subjective/Objective:    Dari ELI Brandoautumn sent in blood glucose log for review. Last date of communication was: 8/14/24.    Gestational diabetes is being managed with medications    Taking diabetes medications: yes:     Diabetes Medication(s)       Insulin       insulin NPH (HUMULIN N KWIKPEN) 100 UNIT/ML injection Inject 12 Units subcutaneously at bedtime Max TDD 30 units as directed by provider.            Estimated Date of Delivery: Jan 6, 2025    BG/Food Log:     (Reports first number is fasting; others are 1 hour post meals)    Assessment:  Ketones: negative x3.   Fasting blood glucoses: 0% in target.  After breakfast: 100% in target.  Before lunch: n/a% in target.  After lunch: n/a% in target.  Before dinner: n/a% in target.  After dinner: 100% in target.    Plan/Response:  Follow-up on Monday.  Increase NPH to 14 units/day (HS).    Andreina Garduno, MPH, RD, CDCES, LD 8/16/2024      Any diabetes medication dose changes were made via the CDE Protocol and Collaborative Practice Agreement with the patient's endocrinology provider. A copy of this encounter was shared with the provider.

## 2024-08-20 ENCOUNTER — TELEPHONE (OUTPATIENT)
Dept: EDUCATION SERVICES | Facility: CLINIC | Age: 34
End: 2024-08-20
Payer: COMMERCIAL

## 2024-08-20 DIAGNOSIS — O24.414 INSULIN CONTROLLED GESTATIONAL DIABETES MELLITUS (GDM) IN SECOND TRIMESTER: ICD-10-CM

## 2024-08-20 RX ORDER — INSULIN HUMAN 100 [IU]/ML
14 INJECTION, SUSPENSION SUBCUTANEOUS AT BEDTIME
Qty: 15 ML | Refills: 1 | Status: SHIPPED | OUTPATIENT
Start: 2024-08-20 | End: 2024-08-21

## 2024-08-20 NOTE — TELEPHONE ENCOUNTER
Gestational Diabetes Follow-up    Subjective/Objective:    Dari M Brandoautumn sent in blood glucose log for review. Last date of communication was: .    Gestational diabetes is being managed with diet, activity, and medications    Taking diabetes medications: yes:     Diabetes Medication(s)       Insulin       insulin NPH (HUMULIN N KWIKPEN) 100 UNIT/ML injection Inject 12 Units subcutaneously at bedtime Max TDD 30 units as directed by provider.            Estimated Date of Delivery: 2025    BG/Food Log:       Assessment:    Ketones: neg.   Fasting blood glucoses: 50% in target.  After breakfast: 100% in target.  Before lunch: x% in target.  After lunch: 100% in target.  Before dinner: x% in target.  After dinner: 25% in target.    Plan/Response:  Increase insulin: 0-0-0-14 --> 0-0-0-17  Follow-up in 1 week.    Rhianna Hilton RD LD [facility]  Triage: [phone]  [phone]      Any diabetes medication dose changes were made via the CDE Protocol and Collaborative Practice Agreement with the patient's referring provider. A copy of this encounter was shared with the provider.

## 2024-08-21 RX ORDER — INSULIN HUMAN 100 [IU]/ML
17 INJECTION, SUSPENSION SUBCUTANEOUS AT BEDTIME
Qty: 15 ML | Refills: 1 | Status: SHIPPED | OUTPATIENT
Start: 2024-08-21 | End: 2024-09-03

## 2024-08-26 NOTE — TELEPHONE ENCOUNTER
Gestational Diabetes Follow-up    Subjective/Objective:    Dari ALCIRA Tidwell sent in blood glucose log for review. Last date of communication was: 8/21.    Gestational diabetes is being managed with diet, activity, and medications    Taking diabetes medications: yes:     Diabetes Medication(s)       Insulin       insulin NPH (HUMULIN N KWIKPEN) 100 UNIT/ML injection Inject 17 Units subcutaneously at bedtime. Max TDD 30 units as directed by provider.            Estimated Date of Delivery: Jan 6, 2025    BG/Food Log:         Assessment:    Ketones: neg.   Fasting blood glucoses: 0% in target.  After breakfast: 100% in target.  Before lunch: -% in target.  After lunch: 0% in target.(Only 1 reading)  Before dinner: -% in target.  After dinner: 100% in target.(2 readings)    Plan/Response:  Recommend increase to insulin - 0-0-0-21 --> 0-0-0-25.     Frank Chapin,     Thank you for sending in your numbers. It's uncommon to need several dose adjustment to find the dose that your body needs. You will likely need several dose adjustments as the pregnancy progresses.      How are you doing with your bedtime snack? The bedtime snack can help reduce the sugar output from the liver. Let us know if you need some ideas.      Plan to increase your insulin to 25 units tonight. If on Thursday all your numbers are still above 95 then increase to 30 units.  Send your numbers again in 1 week. Reach out sooner with questions or concerns.     Do COLE, RN, PHN, CDCES     Do COLE, RN, PHN, CDCES     Any diabetes medication dose changes were made via the CDE Protocol and Collaborative Practice Agreement with the patient's referring provider. A copy of this encounter was shared with the provider.

## 2024-09-03 ENCOUNTER — MYC MEDICAL ADVICE (OUTPATIENT)
Dept: EDUCATION SERVICES | Facility: CLINIC | Age: 34
End: 2024-09-03
Payer: COMMERCIAL

## 2024-09-03 DIAGNOSIS — O24.414 INSULIN CONTROLLED GESTATIONAL DIABETES MELLITUS (GDM) IN SECOND TRIMESTER: ICD-10-CM

## 2024-09-03 RX ORDER — INSULIN HUMAN 100 [IU]/ML
33 INJECTION, SUSPENSION SUBCUTANEOUS AT BEDTIME
Qty: 15 ML | Refills: 1 | Status: SHIPPED | OUTPATIENT
Start: 2024-09-03

## 2024-09-03 NOTE — TELEPHONE ENCOUNTER
Gestational Diabetes Follow-up    Subjective/Objective:    Dari ELI Brandoautunm sent in blood glucose log for review. Last date of communication was: 8/26/24    Gestational diabetes is being managed with diet, activity, and medications    Taking diabetes medications: yes:     Diabetes Medication(s)       Insulin       insulin NPH (HUMULIN N KWIKPEN) 100 UNIT/ML injection Inject 17 Units subcutaneously at bedtime. Max TDD 30 units as directed by provider.          *per pt notes looks like she is taking 30 units insulin.   Estimated Date of Delivery: Jan 6, 2025    BG/Food Log:         Assessment:Majority of fasting above goals. Will increase dose of NPH to 33 units, if fasting above goal x 3 days will increase to 36 units.    Ketones: negative.   Fasting blood glucoses: 30% in target.  After breakfast: 100% in target.  Before lunch: -% in target.  After lunch: 100% in target.  Before dinner: -% in target.  After dinner: 80% in target.    Plan/Response:  Follow-up in 1 week.  NPH- 33 units, if fasting still 95 or greater increase to 36 units    Silke Menard RD, MYRIAM, [facility]      Any diabetes medication dose changes were made via the CDE Protocol and Collaborative Practice Agreement with the patient's endocrinology provider. A copy of this encounter was shared with the provider.

## 2024-09-05 ENCOUNTER — HOSPITAL ENCOUNTER (OUTPATIENT)
Dept: CARDIOLOGY | Facility: CLINIC | Age: 34
Discharge: HOME | End: 2024-09-05
Attending: NURSE PRACTITIONER | Admitting: NURSE PRACTITIONER
Payer: COMMERCIAL

## 2024-09-05 DIAGNOSIS — O26.90 PREGNANCY RELATED CONDITION, ANTEPARTUM: ICD-10-CM

## 2024-09-05 PROCEDURE — 76827 ECHO EXAM OF FETAL HEART: CPT | Mod: 26 | Performed by: PEDIATRICS

## 2024-09-05 PROCEDURE — 76825 ECHO EXAM OF FETAL HEART: CPT | Mod: 26 | Performed by: PEDIATRICS

## 2024-09-05 PROCEDURE — 93325 DOPPLER ECHO COLOR FLOW MAPG: CPT | Mod: 26 | Performed by: PEDIATRICS

## 2024-09-05 PROCEDURE — 76827 ECHO EXAM OF FETAL HEART: CPT

## 2024-09-09 ENCOUNTER — MYC MEDICAL ADVICE (OUTPATIENT)
Dept: EDUCATION SERVICES | Facility: CLINIC | Age: 34
End: 2024-09-09
Payer: COMMERCIAL

## 2024-09-09 NOTE — TELEPHONE ENCOUNTER
Gestational Diabetes Follow-up    Subjective/Objective:    Dari M Brandoautumn sent in blood glucose log for review. Last date of communication was: 9/3/24.    Gestational diabetes is being managed with diet, activity, and medications    Taking diabetes medications: yes:     Diabetes Medication(s)       Insulin       insulin NPH (HUMULIN N KWIKPEN) 100 UNIT/ML injection Inject 33 Units subcutaneously at bedtime. If fasting BG 95 or greater x 3 days increase to 36 units            Estimated Date of Delivery: Jan 6, 2025    BG/Food Log:         Assessment:    Ketones: negative.   Fasting blood glucoses: 100% in target.  After breakfast: 100% in target.  After lunch: 100% in target.  After dinner: 86% in target.    Plan/Response:  No changes in the patient's current treatment plan.  Follow-up in 1 week.    Lorna Dutton, PharmD, [facility], [facility] and [facility]      Any diabetes medication dose changes were made via the CDE Protocol and Collaborative Practice Agreement with the patient's referring provider. A copy of this encounter was shared with the provider.

## 2024-09-16 ENCOUNTER — MYC MEDICAL ADVICE (OUTPATIENT)
Dept: EDUCATION SERVICES | Facility: CLINIC | Age: 34
End: 2024-09-16
Payer: COMMERCIAL

## 2024-09-16 NOTE — TELEPHONE ENCOUNTER
Gestational Diabetes Follow-up    Subjective/Objective:    Dari ALCIRA Tidwell sent in blood glucose log for review. Last date of communication was: 9/9.    Gestational diabetes is being managed with diet, activity, and medications    Taking diabetes medications: yes:     Diabetes Medication(s)       Insulin       insulin NPH (HUMULIN N KWIKPEN) 100 UNIT/ML injection Inject 33 Units subcutaneously at bedtime. If fasting BG 95 or greater x 3 days increase to 36 units            Estimated Date of Delivery: Jan 6, 2025    BG/Food Log:           Assessment:Majority of BG at goals, continue current plan.     Ketones: negative.   Fasting blood glucoses: 88% in target.  After breakfast: 100% in target.  Before lunch: -% in target.  After lunch: 80% in target.  Before dinner: -% in target.  After dinner: 100% in target.    Plan/Response:  No changes in the patient's current treatment plan.    Silke Menard RD, MYRIAM, CDCES      Any diabetes medication dose changes were made via the CDE Protocol and Collaborative Practice Agreement with the patient's endocrinology provider and OB/GYN provider. A copy of this encounter was shared with the provider.

## 2024-09-23 ENCOUNTER — MYC MEDICAL ADVICE (OUTPATIENT)
Dept: EDUCATION SERVICES | Facility: CLINIC | Age: 34
End: 2024-09-23
Payer: COMMERCIAL

## 2024-09-23 NOTE — TELEPHONE ENCOUNTER
Gestational Diabetes Follow-up    Subjective/Objective:    Dari Tidwell sent in blood glucose log for review. Last date of communication was: 9/16.    Gestational diabetes is being managed with diet, activity, and medications    Taking diabetes medications: yes:     Diabetes Medication(s)       Insulin       insulin NPH (HUMULIN N KWIKPEN) 100 UNIT/ML injection Inject 33 Units subcutaneously at bedtime. If fasting BG 95 or greater x 3 days increase to 36 units            Estimated Date of Delivery: Jan 6, 2025    BG/Food Log:         Assessment:Lunch at 33% in target (1 of 3), she noted one of the times eating out and not a lot of movement. She notes being sick on most recent 4 days as well which could impact BG numbers.     Ketones: n/a.   Fasting blood glucoses: 66% in target.  After breakfast: 86% in target.  Before lunch: -% in target.  After lunch: 33% in target-only 3 logged with 1 noting ate out  Before dinner: -% in target.  After dinner: 60% in target.    Plan/Response:  No changes in the patient's current treatment plan.    Silke Menard RD, MYRIAM, CDCES      Any diabetes medication dose changes were made via the CDE Protocol and Collaborative Practice Agreement with the patient's endocrinology provider and OB/GYN provider. A copy of this encounter was shared with the provider.     104

## 2024-09-28 ENCOUNTER — HEALTH MAINTENANCE LETTER (OUTPATIENT)
Age: 34
End: 2024-09-28

## 2024-09-30 ENCOUNTER — MYC MEDICAL ADVICE (OUTPATIENT)
Dept: EDUCATION SERVICES | Facility: CLINIC | Age: 34
End: 2024-09-30
Payer: COMMERCIAL

## 2024-09-30 NOTE — PROGRESS NOTES
Diabetes Consult Note        Dari Tidwell  is a 34 year old female who has no past medical history on file. She is here for follow-up of gestational diabetes.      She is now at 27 weeks and 1 day pregnant.    From previous note:    Her obstetric care has been through [facility].  She had an abnormal glucose testing on July 3 and began education 4 times daily blood sugar checking that day.  She was seen on July 15 and all of her fasting blood sugars were above goal though postmeal were improved with diet intervention.  On July 24 fasting readings again continued to be elevated and she was advised to start taking NPH insulin.  She met with dietitian Silke Armas at [facility] and was prescribed NPH 12 units to be taken each evening however she tells me that she has not yet been able to get needles to go with this so has not started using it.     Estimated due date is December 30, 2024.  Per early first trimester ultrasound.    Dari tells me that this is her second pregnancy.  Her first pregnancy was in 2022 and was unremarkable.  She gave birth to a 6 pound 14 ounce son at 39 weeks and 1 day via spontaneous vaginal delivery.  She is hoping for similar with this pregnancy.  She is adopted and has no known family history of diabetes or otherwise.    Today:  I reviewed her blood sugars prior to our visit.    Recent fasting blood sugars range from 89-99,   1 post breakfast from 1 23-1 65.  1 hour post lunch from 1 32-1 41  1 hour post dinner 100-1 56.      She recently had Madelyn Tsai review her blood sugars online and on 10/4 recommended that she increase her insulin to 36 units nightly.    Dari notes that fasting BG still remain 93 - 94 fasting since NPH increase.    She does not think that she is getting to 180 or 200 g of carbohydrate daily, she tends to have more low carbohydrate meals.  Her after meals BG have been as high as 156.  In regards to snacks, she tries to have air pop popcorn around  10:30am.  She generally will have pistachios before she leaves work around 530, but does not really snack and then does not eat dinner until 1-1/2 to 2 hours later.  It is hard for her to eat in the afternoon due to her work schedule.  Her ketones continue to be negative.  She notes that initially she lost weight on the diabetes diet but now has been gaining a bit of weight and was 174 pounds yesterday.      She denies any difficulties with headaches vision abdominal or low back cramping any bleeding or spotting.  Her energy and sleep are reasonably good at this point in time.    She has not had any elevated blood pressures when this is checked at her OB/GYN office.  She will not begin weekly visits.  She does not have home blood pressure monitoring.     She does have an Apple Watch and feels that she is pretty active most days at work on occasion she tries to use a walking path near her home but is often very tired at the end of the workday.  Generally during the week she will get 4000 to perhaps 10,000 steps at work, on average she thinks this is probably close to 6500 steps per day our weekday.  On the weekend she is much less active and gets typically 1500 to at most 4000 steps per day on the weekend.      Current Diabetes Medications:  NPH 36 units each evening.      We reviewed glucometer/CGMS data together.  It revealed:  Detailed below and reviewed above.      History of DKA: No    History of hospitalizations for diabetes: No    Ability to sense low blood sugars: Presumably intact      Dari's PMH, PSH and allergies were reviewed today and pertinent information is summarized above.  She has no known history of hypertension.  No history of preeclampsia.      Dari's pertinent social and family history are also reviewed today and pertinent information is summarized above.  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  Her 's name is  "Danilo.      Current Outpatient Medications   Medication Sig Dispense Refill    insulin NPH (HUMULIN N KWIKPEN) 100 UNIT/ML injection Inject 33 Units subcutaneously at bedtime. If fasting BG 95 or greater x 3 days increase to 36 units 15 mL 1    insulin pen needle (32G X 4 MM) 32G X 4 MM miscellaneous Use 1-5 pen needles daily or as directed. 200 each 5    insulin pen needle (32G X 4 MM) 32G X 4 MM miscellaneous Use 1 pen needles daily or as directed. 100 each 2    MULTIVITAMIN ORAL [MULTIVITAMIN ORAL] Take by mouth.       No current facility-administered medications for this visit.         ROS:   Reports good physical activity tolerance.  Denies any pedal lesions or vision changes or concerns. Denies any other acute concerns except as noted above.      Exam:    Wt 78.9 kg (174 lb)   LMP 03/25/2024 (Approximate)   BMI 29.87 kg/m    174 lbs 0 oz    Reports that her weight was 182 pounds at her 1st OB/GYN appointment this pregnancy.  179 lbs  last weight, within the last week.    Wt Readings from Last 10 Encounters:   10/08/24 78.9 kg (174 lb)   08/09/24 81.2 kg (179 lb)   11/25/22 84.4 kg (186 lb)   03/02/20 80.2 kg (176 lb 14.4 oz)   02/14/20 79.7 kg (175 lb 9.6 oz)   07/12/19 80.4 kg (177 lb 3.2 oz)   12/18/18 80.9 kg (178 lb 7 oz)   04/28/16 74.4 kg (164 lb)   08/27/15 76.7 kg (169 lb)   07/15/15 75.8 kg (167 lb 1.6 oz)         Height is 5'4\"     General: Pleasant, well nourished and hydrated female in NAD seated comfortably at work.  Psych:  Mood is \"good,\" affect is appropriate.  Thought form and content are fluid and coherent.    HEENT: Eyes and sclera are clear. Extraocular movements are grossly intact without proptosis.  Nares are patent, mucous membranes moist.  Neck: No masses or JVD are noted.    Resp: Easy and unlabored breathing.   Neuro: Alert and oriented, communicating clearly.   Ext: no swelling or edema noted in her face or upper extremities.   Data:      No lab results found.  No results found for: " "\"CPEPT\", \"GADAB\", \"ISCAB\"  No results found for: \"CHOL\"    Most recent GFRs:    No results found for: \"GFRESTIMATED\", \"GFRESTBLACK\"    No results found for: \"CPEPT\", \"GADAB\", \"ISCAB\"  Computed FIB-4 Calculation unavailable. One or more values for this score either were not found within the given timeframe or did not fit some other criterion.  No results found for: \"AST\"  No results found for: \"ALT\"      Glucose tolerance test revealed a 1 hour glucose at 165.    Assessment/Plan:    Dari Tidwell is a 34 year old female with gestational versus type 2 diabetes in second trimester pregnancy.        Gestational diabetes: Her blood sugars are at goal.  She continues to check in with the diabetes education team weekly though most often just via [address] messages.  She is now on NPH 36 units weekly and we discussed increasing this by 2 units if she should have a fasting blood sugar greater than 95 or continues to have 1 hour postprandial blood sugars greater than 140, which are happening most often following her dinner meal.  We discussed that we can continue to simply increase her NPH as long as she is not having low blood sugars, but may need to consider starting short acting insulin with her dinner if that is the only blood sugar that is problematic.  I today am recommending that she however started having a high fiber heart healthy carbohydrate as soon as she finishes her work shift, such as an apple or banana together with the pistachios she generally has, or a single piece of whole-grain bread with peanut butter or cheese, so that she is not eating quite as many carbs at dinner.  I also do recommend a short 10 to 15-minute walk each evening after dinner.  I also am encouraging her to adapt more physically active lifestyle and to do some enjoyable physical activities such as walking hiking or biking on the weekends.  We discussed that her insulin needs should continue to increase in the coming weeks I will see her " back in 1 month.  She will continue to check in with the diabetes education team at least weekly.  She should reach out to my office sooner if she has any concerns.  We discussed reasons that she would need to reach out to her OB office including headache problems with vision nausea weakness fever or spotting.        40 minutes spent on the date of the encounter doing chart review, history and exam, documentation, education and counseling, as well as communication and coordination of care, and further activities as noted above.  This time includes time spent reviewing CGM.      It is my privilege to be involved in the care of the above patient.     Lulu Lara PA-C, MPAS  [facility]  Diabetes, Endocrinology, and Metabolism  [phone] Appointments/Nurse  [phone] pager  [phone] nurse line    This note was completed in part using Dragon voice recognition, and may contain word and grammatical errors.        Virtual Visit Details    Type of service:  Video Visit   Joined the call at 10/8/2024, 12:05:59 pm.  Left the call at 10/8/2024, 12:22:44 pm.  You were on the call for 16 minutes 45 seconds .    Originating Location (pt. Location): work    Distant Location (provider location):  Off-site  Platform used for Video Visit: [place]       Outcome for 09/30/24 7:36 AM: [employer] message sent  Liv Chandler MA  Outcome for 10/07/24 6:46 AM: Glucose readings sent via [employer]ree Chandler MA

## 2024-09-30 NOTE — TELEPHONE ENCOUNTER
Gestational Diabetes Follow-up    Subjective/Objective:    Dari M Brandoautumn sent in blood glucose log for review. Last date of communication was: 9/23/24.    Gestational diabetes is being managed with diet, activity, and medications    Taking diabetes medications: yes:     Diabetes Medication(s)       Insulin       insulin NPH (HUMULIN N KWIKPEN) 100 UNIT/ML injection Inject 33 Units subcutaneously at bedtime. If fasting BG 95 or greater x 3 days increase to 36 units            Estimated Date of Delivery: Jan 6, 2025    BG/Food Log:         Assessment:    Ketones: negative.   Fasting blood glucoses: 87% in target.  After breakfast: 100% in target.  After lunch: 100% in target.  After dinner: 83% in target.    Plan/Response:  No changes in the patient's current treatment plan.  Follow-up in 1 week.    Lorna Dutton, PharmD, [facility], [facility] and [facility]      Any diabetes medication dose changes were made via the CDE Protocol and Collaborative Practice Agreement with the patient's referring provider. A copy of this encounter was shared with the provider.

## 2024-10-04 ENCOUNTER — MYC MEDICAL ADVICE (OUTPATIENT)
Dept: EDUCATION SERVICES | Facility: CLINIC | Age: 34
End: 2024-10-04
Payer: COMMERCIAL

## 2024-10-04 NOTE — TELEPHONE ENCOUNTER
Gestational Diabetes Follow-up    Subjective/Objective:    Dari Tidwell sent in blood glucose log for review. Last date of communication was: 9/30/24.    Gestational diabetes is being managed with diet, activity, and medications    Taking diabetes medications: yes:     Diabetes Medication(s)       Insulin       insulin NPH (HUMULIN N KWIKPEN) 100 UNIT/ML injection Inject 33 Units subcutaneously at bedtime. If fasting BG 95 or greater x 3 days increase to 36 units            Estimated Date of Delivery: Jan 6, 2025    BG/Food Log:   I m messaging since I have had 3 fasting levels over 95. I will admit this mornings level was high due to me forgetting my insulin last night (was sick all day), but it was starting to trend just above 95 (97 and 99).     Assessment:  Fasting blood sugar elevated.  Recommend increase to insulin dose.  Post dinner readings inconsistent.  Continue with one dose at HS    Ketones: none reported.   Fasting blood glucoses: 0% in target.    Plan/Response:  Recommend increase to insulin - NPH 0-0-0-33 --> 0-0-0-36.    Madelyn Tsai MS, RD, LD, CDE      Any diabetes medication dose changes were made via the CDE Protocol and Collaborative Practice Agreement with the patient's endocrinology provider. A copy of this encounter was shared with the provider.

## 2024-10-08 ENCOUNTER — VIRTUAL VISIT (OUTPATIENT)
Dept: ENDOCRINOLOGY | Facility: CLINIC | Age: 34
End: 2024-10-08
Payer: COMMERCIAL

## 2024-10-08 VITALS — BODY MASS INDEX: 29.87 KG/M2 | WEIGHT: 174 LBS

## 2024-10-08 DIAGNOSIS — O24.414 INSULIN CONTROLLED GESTATIONAL DIABETES MELLITUS (GDM) IN SECOND TRIMESTER: ICD-10-CM

## 2024-10-08 PROCEDURE — 99215 OFFICE O/P EST HI 40 MIN: CPT | Mod: 95 | Performed by: PHYSICIAN ASSISTANT

## 2024-10-08 RX ORDER — INSULIN HUMAN 100 [IU]/ML
38-50 INJECTION, SUSPENSION SUBCUTANEOUS AT BEDTIME
Qty: 45 ML | Refills: 1 | Status: SHIPPED | OUTPATIENT
Start: 2024-10-08

## 2024-10-08 NOTE — LETTER
10/8/2024       RE: Dari Tidwell  [address]  [place] [address]     Dear Colleague,    Thank you for referring your patient, Dari Tidwell, to the [facility] [place] [facility]. Please see a copy of my visit note below.             Diabetes Consult Note  August 13, 2024        Dari Tidwell  is a 34 year old female who has no past medical history on file. She is here for follow-up of gestational diabetes.      She is now at 27 weeks and 1 day pregnant.    From previous note:    Her obstetric care has been through [facility].  She had an abnormal glucose testing on July 3 and began education 4 times daily blood sugar checking that day.  She was seen on July 15 and all of her fasting blood sugars were above goal though postmeal were improved with diet intervention.  On July 24 fasting readings again continued to be elevated and she was advised to start taking NPH insulin.  She met with dietitian Silke Armas at [facility] and was prescribed NPH 12 units to be taken each evening however she tells me that she has not yet been able to get needles to go with this so has not started using it.     Estimated due date is December 30, 2024.  Per early first trimester ultrasound.    Dari tells me that this is her second pregnancy.  Her first pregnancy was in 2022 and was unremarkable.  She gave birth to a 6 pound 14 ounce son at 39 weeks and 1 day via spontaneous vaginal delivery.  She is hoping for similar with this pregnancy.  She is adopted and has no known family history of diabetes or otherwise.    Today:  I reviewed her blood sugars prior to our visit.    Recent fasting blood sugars range from 89-99,   1 post breakfast from 1 23-1 65.  1 hour post lunch from 1 32-1 41  1 hour post dinner 100-1 56.      She recently had Madelyn Tsai review her blood sugars online and on 10/4 recommended that she increase her insulin to 36  units nightly.    Dari notes that fasting BG still remain 93 - 94 fasting since NPH increase.    She does not think that she is getting to 180 or 200 g of carbohydrate daily, she tends to have more low carbohydrate meals.  Her after meals BG have been as high as 156.  In regards to snacks, she tries to have air pop popcorn around 10:30am.  She generally will have pistachios before she leaves work around 530, but does not really snack and then does not eat dinner until 1-1/2 to 2 hours later.  It is hard for her to eat in the afternoon due to her work schedule.  Her ketones continue to be negative.  She notes that initially she lost weight on the diabetes diet but now has been gaining a bit of weight and was 174 pounds yesterday.      She denies any difficulties with headaches vision abdominal or low back cramping any bleeding or spotting.  Her energy and sleep are reasonably good at this point in time.    She has not had any elevated blood pressures when this is checked at her OB/GYN office.  She will not begin weekly visits.  She does not have home blood pressure monitoring.     She does have an Apple Watch and feels that she is pretty active most days at work on occasion she tries to use a walking path near her home but is often very tired at the end of the workday.  Generally during the week she will get 4000 to perhaps 10,000 steps at work, on average she thinks this is probably close to 6500 steps per day our weekday.  On the weekend she is much less active and gets typically 1500 to at most 4000 steps per day on the weekend.      Current Diabetes Medications:  NPH 36 units each evening.      We reviewed glucometer/CGMS data together.  It revealed:  Detailed below and reviewed above.      History of DKA: No    History of hospitalizations for diabetes: No    Ability to sense low blood sugars: Presumably intact      Dari's PMH, PSH and allergies were reviewed today and pertinent information is summarized  "above.  She has no known history of hypertension.  No history of preeclampsia.      Dari's pertinent social and family history are also reviewed today and pertinent information is summarized above.  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  Her 's name is Danilo.      Current Outpatient Medications   Medication Sig Dispense Refill     insulin NPH (HUMULIN N KWIKPEN) 100 UNIT/ML injection Inject 33 Units subcutaneously at bedtime. If fasting BG 95 or greater x 3 days increase to 36 units 15 mL 1     insulin pen needle (32G X 4 MM) 32G X 4 MM miscellaneous Use 1-5 pen needles daily or as directed. 200 each 5     insulin pen needle (32G X 4 MM) 32G X 4 MM miscellaneous Use 1 pen needles daily or as directed. 100 each 2     MULTIVITAMIN ORAL [MULTIVITAMIN ORAL] Take by mouth.       No current facility-administered medications for this visit.         ROS:   Reports good physical activity tolerance.  Denies any pedal lesions or vision changes or concerns. Denies any other acute concerns except as noted above.      Exam:    Wt 78.9 kg (174 lb)   LMP 03/25/2024 (Approximate)   BMI 29.87 kg/m    174 lbs 0 oz    Reports that her weight was 182 pounds at her 1st OB/GYN appointment this pregnancy.  179 lbs  last weight, within the last week.    Wt Readings from Last 10 Encounters:   10/08/24 78.9 kg (174 lb)   08/09/24 81.2 kg (179 lb)   11/25/22 84.4 kg (186 lb)   03/02/20 80.2 kg (176 lb 14.4 oz)   02/14/20 79.7 kg (175 lb 9.6 oz)   07/12/19 80.4 kg (177 lb 3.2 oz)   12/18/18 80.9 kg (178 lb 7 oz)   04/28/16 74.4 kg (164 lb)   08/27/15 76.7 kg (169 lb)   07/15/15 75.8 kg (167 lb 1.6 oz)         Height is 5'4\"     General: Pleasant, well nourished and hydrated female in NAD seated comfortably at work.  Psych:  Mood is \"good,\" affect is appropriate.  Thought form and content are fluid and coherent.    HEENT: Eyes and sclera are clear. Extraocular " "movements are grossly intact without proptosis.  Nares are patent, mucous membranes moist.  Neck: No masses or JVD are noted.    Resp: Easy and unlabored breathing.   Neuro: Alert and oriented, communicating clearly.   Ext: no swelling or edema noted in her face or upper extremities.   Data:      No lab results found.  No results found for: \"CPEPT\", \"GADAB\", \"ISCAB\"  No results found for: \"CHOL\"    Most recent GFRs:    No results found for: \"GFRESTIMATED\", \"GFRESTBLACK\"    No results found for: \"CPEPT\", \"GADAB\", \"ISCAB\"  Computed FIB-4 Calculation unavailable. One or more values for this score either were not found within the given timeframe or did not fit some other criterion.  No results found for: \"AST\"  No results found for: \"ALT\"      Glucose tolerance test revealed a 1 hour glucose at 165.    Assessment/Plan:    Dari Tidwell is a 34 year old female with gestational versus type 2 diabetes in second trimester pregnancy.        Gestational diabetes: Her blood sugars are at goal.  She continues to check in with the diabetes education team weekly though most often just via [employer] messages.  She is now on NPH 36 units weekly and we discussed increasing this by 2 units if she should have a fasting blood sugar greater than 95 or continues to have 1 hour postprandial blood sugars greater than 140, which are happening most often following her dinner meal.  We discussed that we can continue to simply increase her NPH as long as she is not having low blood sugars, but may need to consider starting short acting insulin with her dinner if that is the only blood sugar that is problematic.  I today am recommending that she however started having a high fiber heart healthy carbohydrate as soon as she finishes her work shift, such as an apple or banana together with the pistachios she generally has, or a single piece of whole-grain bread with peanut butter or cheese, so that she is not eating quite as many carbs at dinner.  " I also do recommend a short 10 to 15-minute walk each evening after dinner.  I also am encouraging her to adapt more physically active lifestyle and to do some enjoyable physical activities such as walking hiking or biking on the weekends.  We discussed that her insulin needs should continue to increase in the coming weeks I will see her back in 1 month.  She will continue to check in with the diabetes education team at least weekly.  She should reach out to my office sooner if she has any concerns.  We discussed reasons that she would need to reach out to her OB office including headache problems with vision nausea weakness fever or spotting.        40 minutes spent on the date of the encounter doing chart review, history and exam, documentation, education and counseling, as well as communication and coordination of care, and further activities as noted above.  This time includes time spent reviewing CGM.      It is my privilege to be involved in the care of the above patient.     Lulu Lara PA-C, MPAS  [facility]  Diabetes, Endocrinology, and Metabolism  [phone] Appointments/Nurse  [phone] pager  [phone] nurse line    This note was completed in part using Dragon voice recognition, and may contain word and grammatical errors.        Virtual Visit Details    Type of service:  Video Visit   Joined the call at 10/8/2024, 12:05:59 pm.  Left the call at 10/8/2024, 12:22:44 pm.  You were on the call for 16 minutes 45 seconds .    Originating Location (pt. Location): work    Distant Location (provider location):  Off-site  Platform used for Video Visit: [facility]       Outcome for 09/30/24 7:36 AM: [employer] message sent  Liv Chandler MA  Outcome for 10/07/24 6:46 AM: Glucose readings sent via [employer]ot[employer]ree Chandler MA              Again, thank you for allowing me to participate in the care of your patient.      Sincerely,    Lulu Lara PA-C

## 2024-10-08 NOTE — NURSING NOTE
Current patient location:  [place]    Is the patient currently in the state of [place]? YES    Visit mode:VIDEO    If the visit is dropped, the patient can be reconnected by: VIDEO VISIT: Text to cell phone:   Telephone Information:   Mobile [phone]       Will anyone else be joining the visit? NO  (If patient encounters technical issues they should call [phone]    Are changes needed to the allergy or medication list? No    Are refills needed on medications prescribed by this physician? NO    Rooming Documentation:  Questionnaire(s) completed    Reason for visit: Video Visit and RECHECK    Destinee STANLEY

## 2024-10-14 ENCOUNTER — MYC MEDICAL ADVICE (OUTPATIENT)
Dept: EDUCATION SERVICES | Facility: CLINIC | Age: 34
End: 2024-10-14
Payer: COMMERCIAL

## 2024-10-14 NOTE — CONFIDENTIAL NOTE
Gestational Diabetes Follow-up    Subjective/Objective:    Dari ALCIRA Michaelsautumn sent in blood glucose log for review. Last date of communication was: 10/8/24 Endo appt.    Gestational diabetes is being managed with diet, activity, and medications    Taking diabetes medications: yes:     Diabetes Medication(s)       Insulin       insulin NPH (HUMULIN N KWIKPEN) 100 UNIT/ML injection Inject 38-50 Units subcutaneously at bedtime. If fasting BG 95 or greater increase by 2 units each time as long as does not lead to low blood sugars less than 70.            Estimated Date of Delivery: Jan 6, 2025    BG/Food Log:             Assessment:    Ketones: Negative.   Fasting blood glucoses: 100% in target since increase to 38 units on 10/9.  After breakfast: 75% in target.  Before lunch: x% in target.  After lunch: 50% in target. (100% since insulin increase on 10/9)  Before dinner: x% in target.  After dinner: 100% in target.    Plan/Response:  No changes in the patient's current treatment plan.  Continue 38 units of NPH at bedtime.  Follow-up in 1 week.      Enriqueta Gomez RN, [facility]      Any diabetes medication dose changes were made via the CDE Protocol and Collaborative Practice Agreement with the patient's referring provider and endocrinology provider. A copy of this encounter was shared with the provider.

## 2024-10-21 ENCOUNTER — MYC MEDICAL ADVICE (OUTPATIENT)
Dept: EDUCATION SERVICES | Facility: CLINIC | Age: 34
End: 2024-10-21
Payer: COMMERCIAL

## 2024-10-22 NOTE — TELEPHONE ENCOUNTER
Gestational Diabetes Follow-up    Subjective/Objective:    Dari ALCIRA Tidwell sent in blood glucose log for review. Last date of communication was: 10/14.    Gestational diabetes is being managed with diet, activity, and medications    Taking diabetes medications: yes:     Diabetes Medication(s)       Insulin       insulin NPH (HUMULIN N KWIKPEN) 100 UNIT/ML injection Inject 38-50 Units subcutaneously at bedtime. If fasting BG 95 or greater increase by 2 units each time as long as does not lead to low blood sugars less than 70.            Estimated Date of Delivery: Jan 6, 2025    BG/Food Log:             Assessment: Majority of BG at goals, continue with current plan.     Ketones: negative.   Fasting blood glucoses: 78% in target.  After breakfast: 100% in target.  Before lunch: -% in target.  After lunch: 100% in target.  Before dinner: -% in target.  After dinner: 67% in target.    Plan/Response:  No changes in the patient's current treatment plan.  Follow-up in 1 week.    Silke Menard RD, MYRIAM, CDCES      Any diabetes medication dose changes were made via the CDE Protocol and Collaborative Practice Agreement with the patient's OB/GYN provider. A copy of this encounter was shared with the provider.

## 2024-10-28 ENCOUNTER — MYC MEDICAL ADVICE (OUTPATIENT)
Dept: EDUCATION SERVICES | Facility: CLINIC | Age: 34
End: 2024-10-28
Payer: COMMERCIAL

## 2024-10-28 NOTE — TELEPHONE ENCOUNTER
Gestational Diabetes Follow-up    Subjective/Objective:    Dari ELI Brandoautumn sent in blood glucose log for review. Last date of communication was: 10/21.    Gestational diabetes is being managed with diet, activity, and medications    Taking diabetes medications: yes:     Diabetes Medication(s)       Insulin       insulin NPH (HUMULIN N KWIKPEN) 100 UNIT/ML injection Inject 38-50 Units subcutaneously at bedtime. If fasting BG 95 or greater increase by 2 units each time as long as does not lead to low blood sugars less than 70.            Estimated Date of Delivery: Jan 6, 2025    BG/Food Log:           Assessment:    Ketones: neg.   Fasting blood glucoses: 100% in target.  After breakfast: 100% in target.  Before lunch: -% in target.  After lunch: 67% in target.  Before dinner: -% in target.  After dinner: 67% in target.    Plan/Response:  No changes in the patient's current treatment plan.  Follow-up in 1 week.    Do COLE, RN, PHN, CDCES     Any diabetes medication dose changes were made via the CDE Protocol and Collaborative Practice Agreement with the patient's referring provider. A copy of this encounter was shared with the provider.

## 2024-11-05 ENCOUNTER — MYC MEDICAL ADVICE (OUTPATIENT)
Dept: EDUCATION SERVICES | Facility: CLINIC | Age: 34
End: 2024-11-05
Payer: COMMERCIAL

## 2024-11-05 NOTE — CONFIDENTIAL NOTE
Gestational Diabetes Follow-up    Subjective/Objective:    Dari ALCIRA Tidwell sent in blood glucose log for review. Last date of communication was: 10/28.    Gestational diabetes is being managed with diet, activity, and medications    Taking diabetes medications: yes:     Diabetes Medication(s)       Insulin       insulin NPH (HUMULIN N KWIKPEN) 100 UNIT/ML injection Inject 38-50 Units subcutaneously at bedtime. If fasting BG 95 or greater increase by 2 units each time as long as does not lead to low blood sugars less than 70.            Estimated Date of Delivery: 2025    BG/Food Log:           Assessment:    Ketones: .   Fasting blood glucoses: 89% in target.  After breakfast: 100% in target.  Before lunch: x% in target.  After lunch: 50% in target. (Only 2 readings)  Before dinner: x% in target.  After dinner: 75% in target.    Plan/Response:  Follow-up in 1 week.  Only 2 after lunch readings, recommend working on testing after lunch consistently     LOWELL Paez [facility]  Triage: [phone]  [phone]      Any diabetes medication dose changes were made via the CDE Protocol and Collaborative Practice Agreement with the patient's referring provider. A copy of this encounter was shared with the provider.

## 2024-11-08 ENCOUNTER — TELEPHONE (OUTPATIENT)
Dept: ENDOCRINOLOGY | Facility: CLINIC | Age: 34
End: 2024-11-08
Payer: COMMERCIAL

## 2024-11-08 NOTE — TELEPHONE ENCOUNTER
Called patient and left voicemail. Patient has an appointment on  11/12/24 . Need to collect the last 14 days worth of blood sugar readings to prepare for patient's visit.   Liv Chandler MA

## 2024-11-12 ENCOUNTER — TELEPHONE (OUTPATIENT)
Dept: ENDOCRINOLOGY | Facility: CLINIC | Age: 34
End: 2024-11-12

## 2024-11-12 ENCOUNTER — MYC MEDICAL ADVICE (OUTPATIENT)
Dept: ENDOCRINOLOGY | Facility: CLINIC | Age: 34
End: 2024-11-12

## 2024-11-12 DIAGNOSIS — O24.414 INSULIN CONTROLLED GESTATIONAL DIABETES MELLITUS (GDM) IN SECOND TRIMESTER: ICD-10-CM

## 2024-11-12 DIAGNOSIS — O24.419 GESTATIONAL DIABETES MELLITUS (GDM) IN SECOND TRIMESTER, GESTATIONAL DIABETES METHOD OF CONTROL UNSPECIFIED: Primary | ICD-10-CM

## 2024-11-12 NOTE — TELEPHONE ENCOUNTER
This patient was not seen today 11/12/24, provider ran behind and patient had to get back to work. Please call patient to reschedule visit. Bobbilynn Grossaint on 11/12/2024 at 12:36 PM

## 2024-12-02 ENCOUNTER — MYC MEDICAL ADVICE (OUTPATIENT)
Dept: EDUCATION SERVICES | Facility: CLINIC | Age: 34
End: 2024-12-02
Payer: COMMERCIAL

## 2024-12-02 DIAGNOSIS — O24.414 INSULIN CONTROLLED GESTATIONAL DIABETES MELLITUS (GDM) IN SECOND TRIMESTER: Primary | ICD-10-CM

## 2024-12-03 RX ORDER — INSULIN ASPART 100 [IU]/ML
2 INJECTION, SOLUTION INTRAVENOUS; SUBCUTANEOUS
Qty: 15 ML | Refills: 1 | Status: SHIPPED | OUTPATIENT
Start: 2024-12-03

## 2024-12-03 NOTE — TELEPHONE ENCOUNTER
Gestational Diabetes Follow-up    Subjective/Objective:    Dari M Brandoautumn sent in blood glucose log for review. Last date of communication was: 11/26.    Gestational diabetes is being managed with diet, activity, and medications    Taking diabetes medications: yes:     Diabetes Medication(s)       Insulin       insulin NPH (HUMULIN N KWIKPEN) 100 UNIT/ML injection Inject 38-50 Units subcutaneously at bedtime. If fasting BG 95 or greater increase by 2 units each time as long as does not lead to low blood sugars less than 70.     insulin  UNIT/ML injection Inject 10 Units subcutaneously every morning (before breakfast).            Estimated Date of Delivery: Jan 6, 2025    BG/Food Log:         Assessment:Some elevated BG, lunch needs insulin. Will not adjust AM NPH since pt starting with Nelly so will follow protocol of adding mealtime insulin.     Ketones: negative.   Fasting blood glucoses: 67% in target.  After breakfast: 100% in target.  Before lunch: -% in target.  After lunch: 20% in target-1 explainable with Thanksgiving, so would have been 40%  Before dinner: -% in target.  After dinner: 40% in target- 1 explainable with extra cookies after dinner, so would have been 60%    Plan/Response:  Recommend that patient begin Novolog 2 units insulin and increase by 2 units every 2 days until post-meal BG at goals   Continue current NPH doses    Silke Menard RD, LD, [facility]      Any diabetes medication dose changes were made via the CDE Protocol and Collaborative Practice Agreement with the patient's OB/GYN provider. A copy of this encounter was shared with the provider.

## 2024-12-04 ENCOUNTER — VIRTUAL VISIT (OUTPATIENT)
Dept: ENDOCRINOLOGY | Facility: CLINIC | Age: 34
End: 2024-12-04
Payer: COMMERCIAL

## 2024-12-04 DIAGNOSIS — O24.414 INSULIN CONTROLLED GESTATIONAL DIABETES MELLITUS (GDM) IN THIRD TRIMESTER: Primary | ICD-10-CM

## 2024-12-04 PROBLEM — Z78.9 USES BIRTH CONTROL: Status: RESOLVED | Noted: 2017-05-08 | Resolved: 2024-12-04

## 2024-12-04 PROBLEM — O24.419 GESTATIONAL DIABETES MELLITUS: Status: ACTIVE | Noted: 2024-12-04

## 2024-12-04 PROBLEM — E66.3 OVERWEIGHT WITH BODY MASS INDEX (BMI) 25.0-29.9: Status: ACTIVE | Noted: 2024-12-04

## 2024-12-04 PROCEDURE — G2211 COMPLEX E/M VISIT ADD ON: HCPCS | Mod: 95 | Performed by: NURSE PRACTITIONER

## 2024-12-04 PROCEDURE — 99214 OFFICE O/P EST MOD 30 MIN: CPT | Mod: 95 | Performed by: NURSE PRACTITIONER

## 2024-12-04 NOTE — PROGRESS NOTES
[facility]  ENDOCRINOLOGY    Gestational Diabetes 2024    Dari Tidwell, 1990, [id number]          Reason for visit      1. Insulin controlled gestational diabetes mellitus (GDM) in third trimester        HPI     Dari Tidwell is a very pleasant 34 year old old female who presents for GESTATIONAL Diabetes Mellitus.  She is currently 35w2d  pregnant . Due date is 25   Diagnosed with GDM based on an OGTT. She hasnot had  GDM in prior pregnancies.   Current carbohydrate intake:consistent with recommendations of 30g-60g-60g.  I have reviewed her blood glucose logs and note that the:  Fasting readings  are:in range on current regimen  Postprandial readings are:in range on current regimen  Current NPH dose:  Current Prandial insulin: 0  Blood glucose logs/meter brought in and data reviewed and incorporated into decision-making.  [facility]    Therapy/Interventions in the past:  She has been seen by the Diabetes Educator- and has received instruction on carbohydrate counting and  consistency.  Records from referring provider and other sources have also been reviewed and incorporated into decision-making.      TODAY:    Dari is seen today via Video for GDM. She has been under the care of Sylvia Lara in Endocrinology for management of her insulin dosing. We are unsure of why she got to us, but here we are. She is having some postprandial elevations and her CDE saw that she met criteria for prandial insulin, so we sent in the insulin. She has not yet picked it up. Looking at her blood sugars today, I think if she takes a 15 minute walk after her meals she will bring them down enough to avoid the insulin. She is in agreement. Baby is moving appropriately and she is having no swelling. Postpartum instructions given today and all questions answered to her satisfaction. We will try and get her in for one more visit with Sylvia prior to delivery.     Blood  Glucose Numbers:    11/28  Fasting  95  1hour after:  B  128  L  x  D  x    11/29  Fasting  88  1 hour after:  B  94  L  184  D  132    11/30  Fasting  90  1 hour after:  B  131  L  145  D  159    12/1  Fasting  88  1 hour after:  B  135  L  x  D  145    12/2  Fasting  101  1 hour after:  B  135  L  x  D  123    12/3  Fasting  82  1 hour after:  B  135  L  139  D  112    12/4  Fasting 79  1 hour after:  B  x  L  x  D  x    Past Medical History       Patient Active Problem List   Diagnosis    Overweight    Pregnant    Gestational diabetes mellitus    Overweight with body mass index (BMI) 25.0-29.9        Past Surgical History     Past Surgical History:   Procedure Laterality Date    WISDOM TOOTH EXTRACTION         Family History     No family history on file.    Social History     Social History     Tobacco Use    Smoking status: Never    Smokeless tobacco: Never   Substance Use Topics    Alcohol use: Yes     Comment: Alcoholic Drinks/day: rare; less than 1 drink/month    Drug use: No       Review of Systems     Patient has no polyuria or polydipsia, no chest pain, dyspnea or TIA's, no numbness, tingling or pain in extremities  Remainder negative except as noted in HPI.      Vital Signs     LMP 03/25/2024 (Approximate)   Wt Readings from Last 3 Encounters:   10/08/24 78.9 kg (174 lb)   08/09/24 81.2 kg (179 lb)   11/25/22 84.4 kg (186 lb)       Physical Exam     Constitutional:  Well developed, Well nourished  HENT:  Normocephalic,   Neck: normal in appearance  Eyes:  PERRL, Conjunctiva pink  Respiratory:  No respiratory distress  Skin: No acanthosis nigricans, lipoatrophy or lipodystrophy  Neurologic:  Alert & oriented x 3, nonfocal  Psychiatric:  Affect, Mood, Insight appropriate    Assessment     1. Insulin controlled gestational diabetes mellitus (GDM) in third trimester        Plan     1. GESTATIONAL DIABETES-  Adjust dose as follows:    -NPH [id number]   units. Increase by 2 units every 2 days to keep fasting  blood glucose below 95mg/dL  -Novolog 0  units with breakfast  -Novolog 0 units with lunch   -Novolog 0 units with dinner  -Increase by 0 units every 2 days to keep 1 hour after meal blood glucose less than 140mg/dL    We reviewed glucose goals of fasting blood glucose <95 mg/dL and 1 hour post prandial blood glucose of <140 mg/dL.    Monitor blood sugar 4 times daily: Fasting  and 1 hour after each meal.  Contact  this clinic [phone] if blood glucose is not within the above-mentioned goals.     We discussed the importance of excellent glycemic control during pregnancy to limit complications such as fetal macrosomia, shoulder dystocia,  hypoglycemia and hyperbilirubinemia.  I have discussed the patient's increased risk of recurrent GDM and/or development of type 2 diabetes later in life.      F/up with A1c 3 months postpartum with PCP.  May be a candidate for metformin postpartum as she has more than 1 risk factor for future Type 2 Diabetes Mellitus.  She will need a screening A1c at least annually, TLC- including carbohydrate control and exercise.    After you deliver the baby, it is suggested to check your blood sugar occasionally (1 - 2 times per week) for 6 weeks.   When you are not pregnant, normal blood sugars are:     Fasting (before eating anything in the morning)  - under 100 mg/dl  2 hours after meals under 140  The American Diabetes Association recommends:   a glucose tolerance test 6 weeks after you deliver the baby.       a hemoglobin Alc test yearly after delivery.  The Alc test is a 2-3 month average blood sugar reading.        Discuss getting these tests with your provider.     After delivery, and your provider has said that it is safe to be active, work toward getting 150 minutes each week of physical activity to decrease your risk of  getting type 2 diabetes.     Maintaining a healthy body weight will also decrease your risk of getting type 2 diabetes.         Yazmin Hogue,  "NP  12/4/2024      Lab Results     No results found for: \"HGBA1C\", \"CREATININE\", \"MICROALBUR\"    No results found for: \"CHOL\", \"HDL\", \"TRIG\", \"CHOLHDL\"    [unfilled]      Current Medications         Visit Start Time: 0830    Visit Stop time:  0850    Virtual Visit Details    Type of service:  Video Visit     Originating Location (pt. Location): Home    Distant Location (provider location):  On-site  Platform used for Video Visit: Tex"

## 2024-12-04 NOTE — LETTER
2024      Dari Tidwell  [address] [address]      Dear Colleague,    Thank you for referring your patient, Dari Tidwell, to the [facility]. Please see a copy of my visit note below.    [facility]  ENDOCRINOLOGY    Gestational Diabetes 2024    Dari Tidwell, 1990, [id number]          Reason for visit      1. Insulin controlled gestational diabetes mellitus (GDM) in third trimester        HPI     Dari Tidwell is a very pleasant 34 year old old female who presents for GESTATIONAL Diabetes Mellitus.  She is currently 35w2d  pregnant . Due date is 25   Diagnosed with GDM based on an OGTT. She hasnot had  GDM in prior pregnancies.   Current carbohydrate intake:consistent with recommendations of 30g-60g-60g.  I have reviewed her blood glucose logs and note that the:  Fasting readings  are:in range on current regimen  Postprandial readings are:in range on current regimen  Current NPH dose:  Current Prandial insulin: 0  Blood glucose logs/meter brought in and data reviewed and incorporated into decision-making.  [facility]    Therapy/Interventions in the past:  She has been seen by the Diabetes Educator- and has received instruction on carbohydrate counting and  consistency.  Records from referring provider and other sources have also been reviewed and incorporated into decision-making.      TODAY:    Dari is seen today via Video for GDM. She has been under the care of Sylvia Lara in Endocrinology for management of her insulin dosing. We are unsure of why she got to us, but here we are. She is having some postprandial elevations and her CDE saw that she met criteria for prandial insulin, so we sent in the insulin. She has not yet picked it up. Looking at her blood sugars today, I think if she takes a 15 minute walk after her meals she will bring them down enough to avoid the insulin. She is in  agreement. Baby is moving appropriately and she is having no swelling. Postpartum instructions given today and all questions answered to her satisfaction. We will try and get her in for one more visit with Sylvia prior to delivery.     Blood Glucose Numbers:    11/28  Fasting  95  1hour after:  B  128  L  x  D  x    11/29  Fasting  88  1 hour after:  B  94  L  184  D  132    11/30  Fasting  90  1 hour after:  B  131  L  145  D  159    12/1  Fasting  88  1 hour after:  B  135  L  x  D  145    12/2  Fasting  101  1 hour after:  B  135  L  x  D  123    12/3  Fasting  82  1 hour after:  B  135  L  139  D  112    12/4  Fasting 79  1 hour after:  B  x  L  x  D  x    Past Medical History       Patient Active Problem List   Diagnosis     Overweight     Pregnant     Gestational diabetes mellitus     Overweight with body mass index (BMI) 25.0-29.9        Past Surgical History     Past Surgical History:   Procedure Laterality Date     WISDOM TOOTH EXTRACTION         Family History     No family history on file.    Social History     Social History     Tobacco Use     Smoking status: Never     Smokeless tobacco: Never   Substance Use Topics     Alcohol use: Yes     Comment: Alcoholic Drinks/day: rare; less than 1 drink/month     Drug use: No       Review of Systems     Patient has no polyuria or polydipsia, no chest pain, dyspnea or TIA's, no numbness, tingling or pain in extremities  Remainder negative except as noted in HPI.      Vital Signs     LMP 03/25/2024 (Approximate)   Wt Readings from Last 3 Encounters:   10/08/24 78.9 kg (174 lb)   08/09/24 81.2 kg (179 lb)   11/25/22 84.4 kg (186 lb)       Physical Exam     Constitutional:  Well developed, Well nourished  HENT:  Normocephalic,   Neck: normal in appearance  Eyes:  PERRL, Conjunctiva pink  Respiratory:  No respiratory distress  Skin: No acanthosis nigricans, lipoatrophy or lipodystrophy  Neurologic:  Alert & oriented x 3, nonfocal  Psychiatric:  Affect, Mood, Insight  appropriate    Assessment     1. Insulin controlled gestational diabetes mellitus (GDM) in third trimester        Plan     1. GESTATIONAL DIABETES-  Adjust dose as follows:    -NPH [id number]   units. Increase by 2 units every 2 days to keep fasting blood glucose below 95mg/dL  -Novolog 0  units with breakfast  -Novolog 0 units with lunch   -Novolog 0 units with dinner  -Increase by 0 units every 2 days to keep 1 hour after meal blood glucose less than 140mg/dL    We reviewed glucose goals of fasting blood glucose <95 mg/dL and 1 hour post prandial blood glucose of <140 mg/dL.    Monitor blood sugar 4 times daily: Fasting  and 1 hour after each meal.  Contact  this clinic [phone] if blood glucose is not within the above-mentioned goals.     We discussed the importance of excellent glycemic control during pregnancy to limit complications such as fetal macrosomia, shoulder dystocia,  hypoglycemia and hyperbilirubinemia.  I have discussed the patient's increased risk of recurrent GDM and/or development of type 2 diabetes later in life.      F/up with A1c 3 months postpartum with PCP.  May be a candidate for metformin postpartum as she has more than 1 risk factor for future Type 2 Diabetes Mellitus.  She will need a screening A1c at least annually, TLC- including carbohydrate control and exercise.    After you deliver the baby, it is suggested to check your blood sugar occasionally (1 - 2 times per week) for 6 weeks.   When you are not pregnant, normal blood sugars are:     Fasting (before eating anything in the morning)  - under 100 mg/dl  2 hours after meals under 140  The American Diabetes Association recommends:   a glucose tolerance test 6 weeks after you deliver the baby.       a hemoglobin Alc test yearly after delivery.  The Alc test is a 2-3 month average blood sugar reading.        Discuss getting these tests with your provider.     After delivery, and your provider has said that it is safe to  "be active, work toward getting 150 minutes each week of physical activity to decrease your risk of  getting type 2 diabetes.     Maintaining a healthy body weight will also decrease your risk of getting type 2 diabetes.         Yazmin Hogue NP  12/4/2024      Lab Results     No results found for: \"HGBA1C\", \"CREATININE\", \"MICROALBUR\"    No results found for: \"CHOL\", \"HDL\", \"TRIG\", \"CHOLHDL\"    [unfilled]      Current Medications         Visit Start Time: 0830    Visit Stop time:  0850    Virtual Visit Details    Type of service:  Video Visit     Originating Location (pt. Location): Home    Distant Location (provider location):  On-site  Platform used for Video Visit: Tex                         Again, thank you for allowing me to participate in the care of your patient.        Sincerely,        Yazmin Hgoue NP  "

## 2024-12-09 ENCOUNTER — LAB REQUISITION (OUTPATIENT)
Dept: LAB | Facility: CLINIC | Age: 34
End: 2024-12-09
Payer: COMMERCIAL

## 2024-12-09 DIAGNOSIS — Z3A.35 35 WEEKS GESTATION OF PREGNANCY: ICD-10-CM

## 2024-12-09 DIAGNOSIS — O24.414 GESTATIONAL DIABETES MELLITUS IN PREGNANCY, INSULIN CONTROLLED: ICD-10-CM

## 2024-12-09 PROCEDURE — 87653 STREP B DNA AMP PROBE: CPT | Mod: ORL | Performed by: OBSTETRICS & GYNECOLOGY

## 2024-12-10 LAB — GP B STREP DNA SPEC QL NAA+PROBE: POSITIVE

## 2024-12-20 ENCOUNTER — TELEPHONE (OUTPATIENT)
Dept: ENDOCRINOLOGY | Facility: CLINIC | Age: 34
End: 2024-12-20
Payer: COMMERCIAL

## 2024-12-20 NOTE — TELEPHONE ENCOUNTER
----- Message from Andreina Garduno sent at 12/20/2024 12:02 PM CST -----  Hello,     Please assist patient in scheduling ongoing Return GDM appointments with Nelly Hogue/Rhianna Hernandez. Last visit 12/4/24.    Thank you,   Andreina Garduno, MPH, RD, CDCES, LD 12/20/2024

## 2024-12-21 DIAGNOSIS — O24.414 INSULIN CONTROLLED GESTATIONAL DIABETES MELLITUS (GDM) IN SECOND TRIMESTER: ICD-10-CM

## 2024-12-21 NOTE — TELEPHONE ENCOUNTER
Pharmacy: Need max daily units for insurance billing. Please clarify and send in new Rx.      Disp Refills Start End HUSSAIN    insulin aspart (NOVOLOG FLEXPEN) 100 UNIT/ML pen 15 mL 1 12/3/2024 -- No   Sig - Route: Inject 2 Units subcutaneously daily (with lunch). Increase by 2 units every 2 days until post-lunch BG is at goals (less than 140 1 hour OR less than 120 2 hours) - Subcutaneous

## 2024-12-23 RX ORDER — INSULIN ASPART 100 [IU]/ML
2 INJECTION, SOLUTION INTRAVENOUS; SUBCUTANEOUS
Qty: 15 ML | Refills: 1 | Status: SHIPPED | OUTPATIENT
Start: 2024-12-23

## 2024-12-31 ENCOUNTER — ANESTHESIA EVENT (OUTPATIENT)
Dept: OBGYN | Facility: HOSPITAL | Age: 34
End: 2024-12-31
Payer: COMMERCIAL

## 2024-12-31 ENCOUNTER — ANESTHESIA (OUTPATIENT)
Dept: OBGYN | Facility: HOSPITAL | Age: 34
End: 2024-12-31
Payer: COMMERCIAL

## 2024-12-31 ENCOUNTER — HOSPITAL ENCOUNTER (INPATIENT)
Facility: HOSPITAL | Age: 34
LOS: 2 days | Discharge: HOME | End: 2025-01-02
Attending: OBSTETRICS & GYNECOLOGY | Admitting: OBSTETRICS & GYNECOLOGY
Payer: COMMERCIAL

## 2024-12-31 DIAGNOSIS — Z37.9 NORMAL LABOR: Primary | ICD-10-CM

## 2024-12-31 LAB
ABO + RH BLD: NORMAL
BLD GP AB SCN SERPL QL: NEGATIVE
ERYTHROCYTE [DISTWIDTH] IN BLOOD BY AUTOMATED COUNT: 12.6 % (ref 10–15)
EST. AVERAGE GLUCOSE BLD GHB EST-MCNC: 91 MG/DL
HBA1C MFR BLD: 4.8 %
HCT VFR BLD AUTO: 35.6 % (ref 35–47)
HGB BLD-MCNC: 12.4 G/DL (ref 11.7–15.7)
HOLD SPECIMEN: NORMAL
MCH RBC QN AUTO: 30.5 PG (ref 26.5–33)
MCHC RBC AUTO-ENTMCNC: 34.8 G/DL (ref 31.5–36.5)
MCV RBC AUTO: 88 FL (ref 78–100)
PLATELET # BLD AUTO: 197 10E3/UL (ref 150–450)
RBC # BLD AUTO: 4.06 10E6/UL (ref 3.8–5.2)
SPECIMEN EXP DATE BLD: NORMAL
WBC # BLD AUTO: 11.4 10E3/UL (ref 4–11)

## 2024-12-31 PROCEDURE — 250N000013 HC RX MED GY IP 250 OP 250 PS 637: Performed by: OBSTETRICS & GYNECOLOGY

## 2024-12-31 PROCEDURE — 120N000001 HC R&B MED SURG/OB

## 2024-12-31 PROCEDURE — 0UQGXZZ REPAIR VAGINA, EXTERNAL APPROACH: ICD-10-PCS | Performed by: OBSTETRICS & GYNECOLOGY

## 2024-12-31 PROCEDURE — 36415 COLL VENOUS BLD VENIPUNCTURE: CPT | Performed by: OBSTETRICS & GYNECOLOGY

## 2024-12-31 PROCEDURE — 86780 TREPONEMA PALLIDUM: CPT | Performed by: OBSTETRICS & GYNECOLOGY

## 2024-12-31 PROCEDURE — 85027 COMPLETE CBC AUTOMATED: CPT | Performed by: OBSTETRICS & GYNECOLOGY

## 2024-12-31 PROCEDURE — 86850 RBC ANTIBODY SCREEN: CPT | Performed by: OBSTETRICS & GYNECOLOGY

## 2024-12-31 PROCEDURE — 250N000011 HC RX IP 250 OP 636: Performed by: OBSTETRICS & GYNECOLOGY

## 2024-12-31 PROCEDURE — 250N000011 HC RX IP 250 OP 636: Performed by: ANESTHESIOLOGY

## 2024-12-31 PROCEDURE — 86900 BLOOD TYPING SEROLOGIC ABO: CPT | Performed by: OBSTETRICS & GYNECOLOGY

## 2024-12-31 PROCEDURE — 722N000001 HC LABOR CARE VAGINAL DELIVERY SINGLE

## 2024-12-31 PROCEDURE — 370N000003 HC ANESTHESIA WARD SERVICE: Performed by: ANESTHESIOLOGY

## 2024-12-31 PROCEDURE — 83036 HEMOGLOBIN GLYCOSYLATED A1C: CPT | Performed by: OBSTETRICS & GYNECOLOGY

## 2024-12-31 PROCEDURE — 258N000003 HC RX IP 258 OP 636: Performed by: OBSTETRICS & GYNECOLOGY

## 2024-12-31 PROCEDURE — 250N000009 HC RX 250: Performed by: OBSTETRICS & GYNECOLOGY

## 2024-12-31 PROCEDURE — 3E0R3BZ INTRODUCTION OF ANESTHETIC AGENT INTO SPINAL CANAL, PERCUTANEOUS APPROACH: ICD-10-PCS | Performed by: ANESTHESIOLOGY

## 2024-12-31 PROCEDURE — 00HU33Z INSERTION OF INFUSION DEVICE INTO SPINAL CANAL, PERCUTANEOUS APPROACH: ICD-10-PCS | Performed by: ANESTHESIOLOGY

## 2024-12-31 RX ORDER — NALBUPHINE HYDROCHLORIDE 20 MG/ML
2.5-5 INJECTION, SOLUTION INTRAMUSCULAR; INTRAVENOUS; SUBCUTANEOUS EVERY 6 HOURS PRN
Status: DISCONTINUED | OUTPATIENT
Start: 2024-12-31 | End: 2025-01-02 | Stop reason: HOSPADM

## 2024-12-31 RX ORDER — CARBOPROST TROMETHAMINE 250 UG/ML
250 INJECTION, SOLUTION INTRAMUSCULAR
Status: DISCONTINUED | OUTPATIENT
Start: 2024-12-31 | End: 2024-12-31 | Stop reason: HOSPADM

## 2024-12-31 RX ORDER — FENTANYL CITRATE-0.9 % NACL/PF 10 MCG/ML
100 PLASTIC BAG, INJECTION (ML) INTRAVENOUS EVERY 5 MIN PRN
Status: DISCONTINUED | OUTPATIENT
Start: 2024-12-31 | End: 2024-12-31 | Stop reason: HOSPADM

## 2024-12-31 RX ORDER — LOPERAMIDE HYDROCHLORIDE 2 MG/1
2 CAPSULE ORAL
Status: DISCONTINUED | OUTPATIENT
Start: 2024-12-31 | End: 2025-01-02 | Stop reason: HOSPADM

## 2024-12-31 RX ORDER — FENTANYL CITRATE 50 UG/ML
100 INJECTION, SOLUTION INTRAMUSCULAR; INTRAVENOUS ONCE
Status: DISCONTINUED | OUTPATIENT
Start: 2024-12-31 | End: 2024-12-31 | Stop reason: HOSPADM

## 2024-12-31 RX ORDER — NICOTINE POLACRILEX 4 MG
15-30 LOZENGE BUCCAL
Status: DISCONTINUED | OUTPATIENT
Start: 2024-12-31 | End: 2024-12-31 | Stop reason: HOSPADM

## 2024-12-31 RX ORDER — NALOXONE HYDROCHLORIDE 0.4 MG/ML
0.2 INJECTION, SOLUTION INTRAMUSCULAR; INTRAVENOUS; SUBCUTANEOUS
Status: DISCONTINUED | OUTPATIENT
Start: 2024-12-31 | End: 2024-12-31 | Stop reason: HOSPADM

## 2024-12-31 RX ORDER — NICOTINE POLACRILEX 4 MG
15-30 LOZENGE BUCCAL
Status: DISCONTINUED | OUTPATIENT
Start: 2024-12-31 | End: 2025-01-02 | Stop reason: HOSPADM

## 2024-12-31 RX ORDER — NALOXONE HYDROCHLORIDE 0.4 MG/ML
0.4 INJECTION, SOLUTION INTRAMUSCULAR; INTRAVENOUS; SUBCUTANEOUS
Status: DISCONTINUED | OUTPATIENT
Start: 2024-12-31 | End: 2024-12-31 | Stop reason: HOSPADM

## 2024-12-31 RX ORDER — CITRIC ACID/SODIUM CITRATE 334-500MG
30 SOLUTION, ORAL ORAL
Status: DISCONTINUED | OUTPATIENT
Start: 2024-12-31 | End: 2024-12-31 | Stop reason: HOSPADM

## 2024-12-31 RX ORDER — TRANEXAMIC ACID 10 MG/ML
1 INJECTION, SOLUTION INTRAVENOUS EVERY 30 MIN PRN
Status: DISCONTINUED | OUTPATIENT
Start: 2024-12-31 | End: 2024-12-31 | Stop reason: HOSPADM

## 2024-12-31 RX ORDER — METHYLERGONOVINE MALEATE 0.2 MG/ML
200 INJECTION INTRAVENOUS
Status: DISCONTINUED | OUTPATIENT
Start: 2024-12-31 | End: 2025-01-02 | Stop reason: HOSPADM

## 2024-12-31 RX ORDER — PENICILLIN G 3000000 [IU]/50ML
3 INJECTION, SOLUTION INTRAVENOUS EVERY 4 HOURS
Status: DISCONTINUED | OUTPATIENT
Start: 2024-12-31 | End: 2024-12-31 | Stop reason: HOSPADM

## 2024-12-31 RX ORDER — OXYTOCIN/0.9 % SODIUM CHLORIDE 30/500 ML
100-340 PLASTIC BAG, INJECTION (ML) INTRAVENOUS CONTINUOUS PRN
Status: DISCONTINUED | OUTPATIENT
Start: 2024-12-31 | End: 2025-01-02 | Stop reason: HOSPADM

## 2024-12-31 RX ORDER — KETOROLAC TROMETHAMINE 30 MG/ML
30 INJECTION, SOLUTION INTRAMUSCULAR; INTRAVENOUS
Status: DISCONTINUED | OUTPATIENT
Start: 2024-12-31 | End: 2025-01-02 | Stop reason: HOSPADM

## 2024-12-31 RX ORDER — ONDANSETRON 4 MG/1
4 TABLET, ORALLY DISINTEGRATING ORAL EVERY 6 HOURS PRN
Status: DISCONTINUED | OUTPATIENT
Start: 2024-12-31 | End: 2024-12-31 | Stop reason: HOSPADM

## 2024-12-31 RX ORDER — OXYTOCIN 10 [USP'U]/ML
10 INJECTION, SOLUTION INTRAMUSCULAR; INTRAVENOUS
Status: DISCONTINUED | OUTPATIENT
Start: 2024-12-31 | End: 2024-12-31 | Stop reason: HOSPADM

## 2024-12-31 RX ORDER — DEXTROSE MONOHYDRATE 25 G/50ML
25-50 INJECTION, SOLUTION INTRAVENOUS
Status: DISCONTINUED | OUTPATIENT
Start: 2024-12-31 | End: 2024-12-31 | Stop reason: HOSPADM

## 2024-12-31 RX ORDER — OXYTOCIN/0.9 % SODIUM CHLORIDE 30/500 ML
340 PLASTIC BAG, INJECTION (ML) INTRAVENOUS CONTINUOUS PRN
Status: DISCONTINUED | OUTPATIENT
Start: 2024-12-31 | End: 2024-12-31 | Stop reason: HOSPADM

## 2024-12-31 RX ORDER — PROCHLORPERAZINE MALEATE 10 MG
10 TABLET ORAL EVERY 6 HOURS PRN
Status: DISCONTINUED | OUTPATIENT
Start: 2024-12-31 | End: 2024-12-31 | Stop reason: HOSPADM

## 2024-12-31 RX ORDER — SODIUM CHLORIDE, SODIUM LACTATE, POTASSIUM CHLORIDE, CALCIUM CHLORIDE 600; 310; 30; 20 MG/100ML; MG/100ML; MG/100ML; MG/100ML
INJECTION, SOLUTION INTRAVENOUS CONTINUOUS
Status: DISCONTINUED | OUTPATIENT
Start: 2024-12-31 | End: 2024-12-31 | Stop reason: HOSPADM

## 2024-12-31 RX ORDER — OXYTOCIN/0.9 % SODIUM CHLORIDE 30/500 ML
340 PLASTIC BAG, INJECTION (ML) INTRAVENOUS CONTINUOUS PRN
Status: DISCONTINUED | OUTPATIENT
Start: 2024-12-31 | End: 2025-01-02 | Stop reason: HOSPADM

## 2024-12-31 RX ORDER — CARBOPROST TROMETHAMINE 250 UG/ML
250 INJECTION, SOLUTION INTRAMUSCULAR
Status: DISCONTINUED | OUTPATIENT
Start: 2024-12-31 | End: 2025-01-02 | Stop reason: HOSPADM

## 2024-12-31 RX ORDER — ACETAMINOPHEN 325 MG/1
650 TABLET ORAL EVERY 4 HOURS PRN
Status: DISCONTINUED | OUTPATIENT
Start: 2024-12-31 | End: 2024-12-31 | Stop reason: HOSPADM

## 2024-12-31 RX ORDER — DOCUSATE SODIUM 100 MG/1
100 CAPSULE, LIQUID FILLED ORAL DAILY
Status: DISCONTINUED | OUTPATIENT
Start: 2025-01-01 | End: 2025-01-02 | Stop reason: HOSPADM

## 2024-12-31 RX ORDER — IBUPROFEN 800 MG/1
800 TABLET, FILM COATED ORAL
Status: DISCONTINUED | OUTPATIENT
Start: 2024-12-31 | End: 2025-01-02 | Stop reason: HOSPADM

## 2024-12-31 RX ORDER — ONDANSETRON 2 MG/ML
4 INJECTION INTRAMUSCULAR; INTRAVENOUS EVERY 6 HOURS PRN
Status: DISCONTINUED | OUTPATIENT
Start: 2024-12-31 | End: 2024-12-31 | Stop reason: HOSPADM

## 2024-12-31 RX ORDER — VITAMIN A ACETATE, .BETA.-CAROTENE, ASCORBIC ACID, CHOLECALCIFEROL, .ALPHA.-TOCOPHEROL ACETATE, DL-, THIAMINE MONONITRATE, RIBOFLAVIN, NIACINAMIDE, PYRIDOXINE HYDROCHLORIDE, FOLIC ACID, CYANOCOBALAMIN, CALCIUM CARBONATE, FERROUS FUMARATE, ZINC OXIDE, AND CUPRIC OXIDE 2000; 2000; 120; 400; 22; 1.84; 3; 20; 10; 1; 12; 200; 27; 25; 2 [IU]/1; [IU]/1; MG/1; [IU]/1; MG/1; MG/1; MG/1; MG/1; MG/1; MG/1; UG/1; MG/1; MG/1; MG/1; MG/1
1 TABLET ORAL DAILY
COMMUNITY

## 2024-12-31 RX ORDER — LOPERAMIDE HYDROCHLORIDE 2 MG/1
4 CAPSULE ORAL
Status: DISCONTINUED | OUTPATIENT
Start: 2024-12-31 | End: 2025-01-02 | Stop reason: HOSPADM

## 2024-12-31 RX ORDER — LOPERAMIDE HYDROCHLORIDE 2 MG/1
4 CAPSULE ORAL
Status: DISCONTINUED | OUTPATIENT
Start: 2024-12-31 | End: 2024-12-31 | Stop reason: HOSPADM

## 2024-12-31 RX ORDER — LIDOCAINE 40 MG/G
CREAM TOPICAL
Status: DISCONTINUED | OUTPATIENT
Start: 2024-12-31 | End: 2024-12-31 | Stop reason: HOSPADM

## 2024-12-31 RX ORDER — FENTANYL/ROPIVACAINE/NS/PF 2MCG/ML-.1
PLASTIC BAG, INJECTION (ML) EPIDURAL
Status: DISCONTINUED | OUTPATIENT
Start: 2024-12-31 | End: 2024-12-31 | Stop reason: HOSPADM

## 2024-12-31 RX ORDER — BISACODYL 10 MG
10 SUPPOSITORY, RECTAL RECTAL DAILY PRN
Status: DISCONTINUED | OUTPATIENT
Start: 2024-12-31 | End: 2025-01-02 | Stop reason: HOSPADM

## 2024-12-31 RX ORDER — MISOPROSTOL 200 UG/1
800 TABLET ORAL
Status: DISCONTINUED | OUTPATIENT
Start: 2024-12-31 | End: 2024-12-31 | Stop reason: HOSPADM

## 2024-12-31 RX ORDER — METOCLOPRAMIDE 10 MG/1
10 TABLET ORAL EVERY 6 HOURS PRN
Status: DISCONTINUED | OUTPATIENT
Start: 2024-12-31 | End: 2024-12-31 | Stop reason: HOSPADM

## 2024-12-31 RX ORDER — MISOPROSTOL 200 UG/1
800 TABLET ORAL
Status: DISCONTINUED | OUTPATIENT
Start: 2024-12-31 | End: 2025-01-02 | Stop reason: HOSPADM

## 2024-12-31 RX ORDER — METHYLERGONOVINE MALEATE 0.2 MG/ML
200 INJECTION INTRAVENOUS
Status: DISCONTINUED | OUTPATIENT
Start: 2024-12-31 | End: 2024-12-31 | Stop reason: HOSPADM

## 2024-12-31 RX ORDER — IBUPROFEN 800 MG/1
800 TABLET, FILM COATED ORAL EVERY 6 HOURS PRN
Status: DISCONTINUED | OUTPATIENT
Start: 2024-12-31 | End: 2025-01-02 | Stop reason: HOSPADM

## 2024-12-31 RX ORDER — BUPIVACAINE HYDROCHLORIDE 2.5 MG/ML
INJECTION, SOLUTION EPIDURAL; INFILTRATION; INTRACAUDAL
Status: COMPLETED | OUTPATIENT
Start: 2024-12-31 | End: 2024-12-31

## 2024-12-31 RX ORDER — OXYTOCIN 10 [USP'U]/ML
10 INJECTION, SOLUTION INTRAMUSCULAR; INTRAVENOUS
Status: DISCONTINUED | OUTPATIENT
Start: 2024-12-31 | End: 2025-01-02 | Stop reason: HOSPADM

## 2024-12-31 RX ORDER — BUPIVACAINE HYDROCHLORIDE 2.5 MG/ML
0.25 INJECTION, SOLUTION EPIDURAL; INFILTRATION; INTRACAUDAL ONCE
Status: DISCONTINUED | OUTPATIENT
Start: 2024-12-31 | End: 2024-12-31 | Stop reason: HOSPADM

## 2024-12-31 RX ORDER — FENTANYL CITRATE 50 UG/ML
100 INJECTION, SOLUTION INTRAMUSCULAR; INTRAVENOUS
Status: DISCONTINUED | OUTPATIENT
Start: 2024-12-31 | End: 2024-12-31 | Stop reason: HOSPADM

## 2024-12-31 RX ORDER — MODIFIED LANOLIN
OINTMENT (GRAM) TOPICAL
Status: DISCONTINUED | OUTPATIENT
Start: 2024-12-31 | End: 2025-01-02 | Stop reason: HOSPADM

## 2024-12-31 RX ORDER — FENTANYL CITRATE 50 UG/ML
INJECTION, SOLUTION INTRAMUSCULAR; INTRAVENOUS
Status: COMPLETED | OUTPATIENT
Start: 2024-12-31 | End: 2024-12-31

## 2024-12-31 RX ORDER — TRANEXAMIC ACID 10 MG/ML
1 INJECTION, SOLUTION INTRAVENOUS EVERY 30 MIN PRN
Status: DISCONTINUED | OUTPATIENT
Start: 2024-12-31 | End: 2025-01-02 | Stop reason: HOSPADM

## 2024-12-31 RX ORDER — ACETAMINOPHEN 325 MG/1
650 TABLET ORAL EVERY 4 HOURS PRN
Status: DISCONTINUED | OUTPATIENT
Start: 2024-12-31 | End: 2025-01-02 | Stop reason: HOSPADM

## 2024-12-31 RX ORDER — HYDROCORTISONE 25 MG/G
CREAM TOPICAL 3 TIMES DAILY PRN
Status: DISCONTINUED | OUTPATIENT
Start: 2024-12-31 | End: 2025-01-02 | Stop reason: HOSPADM

## 2024-12-31 RX ORDER — LOPERAMIDE HYDROCHLORIDE 2 MG/1
2 CAPSULE ORAL
Status: DISCONTINUED | OUTPATIENT
Start: 2024-12-31 | End: 2024-12-31 | Stop reason: HOSPADM

## 2024-12-31 RX ORDER — MISOPROSTOL 200 UG/1
400 TABLET ORAL
Status: DISCONTINUED | OUTPATIENT
Start: 2024-12-31 | End: 2024-12-31 | Stop reason: HOSPADM

## 2024-12-31 RX ORDER — MISOPROSTOL 200 UG/1
400 TABLET ORAL
Status: DISCONTINUED | OUTPATIENT
Start: 2024-12-31 | End: 2025-01-02 | Stop reason: HOSPADM

## 2024-12-31 RX ORDER — DEXTROSE MONOHYDRATE 25 G/50ML
25-50 INJECTION, SOLUTION INTRAVENOUS
Status: DISCONTINUED | OUTPATIENT
Start: 2024-12-31 | End: 2025-01-02 | Stop reason: HOSPADM

## 2024-12-31 RX ORDER — METOCLOPRAMIDE HYDROCHLORIDE 5 MG/ML
10 INJECTION INTRAMUSCULAR; INTRAVENOUS EVERY 6 HOURS PRN
Status: DISCONTINUED | OUTPATIENT
Start: 2024-12-31 | End: 2024-12-31 | Stop reason: HOSPADM

## 2024-12-31 RX ORDER — PENICILLIN G POTASSIUM 5000000 [IU]/1
5 INJECTION, POWDER, FOR SOLUTION INTRAMUSCULAR; INTRAVENOUS ONCE
Status: COMPLETED | OUTPATIENT
Start: 2024-12-31 | End: 2024-12-31

## 2024-12-31 RX ADMIN — IBUPROFEN 800 MG: 800 TABLET ORAL at 20:50

## 2024-12-31 RX ADMIN — FENTANYL CITRATE 25 MCG: 50 INJECTION, SOLUTION INTRAMUSCULAR; INTRAVENOUS at 19:12

## 2024-12-31 RX ADMIN — BUPIVACAINE HYDROCHLORIDE 1.5 ML: 2.5 INJECTION, SOLUTION EPIDURAL; INFILTRATION; INTRACAUDAL at 19:12

## 2024-12-31 RX ADMIN — SODIUM CHLORIDE, POTASSIUM CHLORIDE, SODIUM LACTATE AND CALCIUM CHLORIDE: 600; 310; 30; 20 INJECTION, SOLUTION INTRAVENOUS at 18:12

## 2024-12-31 RX ADMIN — PENICILLIN G POTASSIUM 5 MILLION UNITS: 5000000 POWDER, FOR SOLUTION INTRAMUSCULAR; INTRAPLEURAL; INTRATHECAL; INTRAVENOUS at 18:11

## 2024-12-31 RX ADMIN — Medication 340 ML/HR: at 19:29

## 2024-12-31 ASSESSMENT — ACTIVITIES OF DAILY LIVING (ADL)
ADLS_ACUITY_SCORE: 20
ADLS_ACUITY_SCORE: 20
ADLS_ACUITY_SCORE: 21
ADLS_ACUITY_SCORE: 20
ADLS_ACUITY_SCORE: 21
ADLS_ACUITY_SCORE: 20

## 2024-12-31 NOTE — H&P
[facility]    History and Physical  Obstetrics and Gynecology     Date of Admission:  2024    Assessment & Plan   Dari Tidwell is a 34 year old  at 39w1d who is admitted for augmentation of labor.      PLAN:   Admit - see IP orders  Labor induction with Pitocin  Pain medication as desired  GDMA2- insulin home doses ordered.  Prophylactic antibiotic for + GBS status  Anticipate     Charline William MD, FACOG, [facility]  2024 5:41 PM          History of Present Illness   Dari Tidwell is a 34 year old female  at 39w1d is [facility] for painful contractions - due to be induced tomorrow morning. Advanced cervical dilation and GMDA2, so keeping to augment    PRENATAL COURSE  Prenatal course was complicated by GDMA2 on insulin,       Prenatal labs notable for:GBS positive, obesity      Past Medical History    I have reviewed this patient's medical history and updated it with pertinent information if needed.   Past Medical History:   Diagnosis Date    Gestational diabetes        Past Surgical History   I have reviewed this patient's surgical history and updated it with pertinent information if needed.  Past Surgical History:   Procedure Laterality Date    WISDOM TOOTH EXTRACTION         Prior to Admission Medications   Prior to Admission Medications   Prescriptions Last Dose Informant Patient Reported? Taking?   MULTIVITAMIN ORAL   Yes No   Sig: [MULTIVITAMIN ORAL] Take by mouth.   Prenatal Vit-Fe Fumarate-FA (PNV PRENATAL PLUS MULTIVITAMIN) 27-1 MG TABS per tablet 2024 Morning  Yes Yes   Sig: Take 1 tablet by mouth daily.   blood glucose (NO BRAND SPECIFIED) test strip   Yes No   Sig: USE AS DIRECTED TO TEST FOUR TIMES DAILY   blood glucose (NO BRAND SPECIFIED) test strip   Yes No   Sig: USE AS DIRECTED TO TEST FOUR TIMES DAILY   insulin NPH (HUMULIN N KWIKPEN) 100 UNIT/ML injection 2024 at  9:00 PM  No Yes   Sig: Inject 38-50 Units  subcutaneously at bedtime. If fasting BG 95 or greater increase by 2 units each time as long as does not lead to low blood sugars less than 70.   insulin  UNIT/ML injection 12/30/2024 at  8:00 AM  No Yes   Sig: Inject 10 Units subcutaneously every morning (before breakfast).   insulin aspart (NOVOLOG FLEXPEN) 100 UNIT/ML pen   No No   Sig: Inject 2 Units subcutaneously daily (with lunch). Increase by 2 units every 2 days until post-lunch BG is at goals (less than 140 1 hour OR less than 120 2 hours)   insulin pen needle (32G X 4 MM) 32G X 4 MM miscellaneous   No No   Sig: Use 1 pen needles daily or as directed.   insulin pen needle (32G X 4 MM) 32G X 4 MM miscellaneous   No No   Sig: Use 1-5 pen needles daily or as directed.      Facility-Administered Medications: None     Allergies   No Known Allergies    Social History   I have reviewed this patient's social history and updated it with pertinent information if needed. Dari Tidwell  reports that she has never smoked. She has never used smokeless tobacco. She reports that she does not currently use alcohol. She reports that she does not use drugs.    Immunization History   TDAP  given this pregnancy.  Influenza vaccine  given this pregnancy.    Physical Exam   Temp: 98.1  F (36.7  C) Temp src: Oral BP: 124/76 Pulse: 69   Resp: 18   O2 Device: None (Room air)    Vital Signs with Ranges  Temp:  [98.1  F (36.7  C)] 98.1  F (36.7  C)  Pulse:  [69] 69  Resp:  [18] 18  BP: (124)/(76) 124/76    Abdomen: gravid, single vertex fetus, non-tender, EFW 7 lbs   Cervical Exam: 4/80/-3    Fetal Heart Tones: Category 1, reactive   TOCO:   External monitor     Constitutional: healthy, alert  Respiratory: No increased work of breathing, good air exchange.  Cardiovascular: RRR

## 2024-12-31 NOTE — PROGRESS NOTES
Data: Patient presented to [facility]: 2024  3:48 PM.  Reason for maternal/fetal assessment is uterine contractions. Patient reports she started dae this morning around 0600 and is now dae every 12-13 min. Patient denies leaking of vaginal fluid/rupture of membranes, vaginal bleeding, pelvic pressure, nausea, vomiting, headache, visual disturbances, epigastric or RUQ pain, significant edema. Patient reports fetal movement is normal. Patient is a 39w1d . Prenatal record reviewed. Pregnancy has been complicated by diabetes. Support person is present.     Fetal HR baseline was 135, variability is moderate. Accelerations: present. Decelerations: absent. Uterine assessment is moderate during contractions and soft at rest. Cervix 4 cm dilated and 80% effaced. Fetal station -3. Fetal presentation   per cervical exam. Membranes: intact.    Vital signs wnl. Patient reports pain and is coping.     Action: Verbal consent for EFM. Triage assessment completed. Patient may meet criteria for early labor discharge.     Response: Patient verbalized understanding of triage assessment. Will contact Dr. Mcarthur with assessment and consideration of early labor discharge vs admission.       Dr. Mcarthur notified of patient arrival. Category 1 fetal tracing. OK to take patient off monitor. Plan to recheck patient in 1 hour and reassess plan of care at that time.

## 2024-12-31 NOTE — PROVIDER NOTIFICATION
12/31/24 1725   Provider Notification   Provider Name/Title Nataliia MCLAIN   Method of Notification At Bedside   Request Evaluate in Person   Notification Reason Status Update     Patient to be admitted to labor. Awaiting orders from MD. Plan to begin GBS prophylaxis. Patient declines SVE at this time.

## 2025-01-01 LAB
GLUCOSE BLDC GLUCOMTR-MCNC: 70 MG/DL (ref 70–99)
HGB BLD-MCNC: 11.3 G/DL (ref 11.7–15.7)
T PALLIDUM AB SER QL: NONREACTIVE

## 2025-01-01 PROCEDURE — 85018 HEMOGLOBIN: CPT | Performed by: OBSTETRICS & GYNECOLOGY

## 2025-01-01 PROCEDURE — 250N000013 HC RX MED GY IP 250 OP 250 PS 637: Performed by: OBSTETRICS & GYNECOLOGY

## 2025-01-01 PROCEDURE — 120N000001 HC R&B MED SURG/OB

## 2025-01-01 PROCEDURE — 36415 COLL VENOUS BLD VENIPUNCTURE: CPT | Performed by: OBSTETRICS & GYNECOLOGY

## 2025-01-01 RX ADMIN — DOCUSATE SODIUM 100 MG: 100 CAPSULE, LIQUID FILLED ORAL at 08:33

## 2025-01-01 RX ADMIN — IBUPROFEN 800 MG: 800 TABLET ORAL at 22:04

## 2025-01-01 RX ADMIN — ACETAMINOPHEN 650 MG: 325 TABLET ORAL at 18:29

## 2025-01-01 RX ADMIN — IBUPROFEN 800 MG: 800 TABLET ORAL at 15:58

## 2025-01-01 RX ADMIN — IBUPROFEN 800 MG: 800 TABLET ORAL at 08:33

## 2025-01-01 RX ADMIN — ACETAMINOPHEN 650 MG: 325 TABLET ORAL at 12:54

## 2025-01-01 ASSESSMENT — ACTIVITIES OF DAILY LIVING (ADL)
ADLS_ACUITY_SCORE: 24

## 2025-01-01 NOTE — PLAN OF CARE
Goal Outcome Evaluation:      Plan of Care Reviewed With: patient    Overall Patient Progress: improving    Outcome Evaluation: Patient's vital signs and OB assessments WNL. Fundus firm, bleeding light. Patient is ambulating and voiding independently. Breastfeeding well. Denies pain. Paperwork was introduced and at bedside.    Georgie Eli RN on 1/1/2025 at 5:12 AM

## 2025-01-01 NOTE — ANESTHESIA POSTPROCEDURE EVALUATION
Patient: Dari Tidwell    Procedure: * No procedures listed *       Anesthesia Type:  Epidural    Note:  Disposition: Inpatient   Postop Pain Control: Uneventful            Sign Out: Well controlled pain   PONV: No   Neuro/Psych: Uneventful            Sign Out: Acceptable/Baseline neuro status   Airway/Respiratory: Uneventful            Sign Out: Acceptable/Baseline resp. status   CV/Hemodynamics: Uneventful            Sign Out: Acceptable CV status; No obvious hypovolemia; No obvious fluid overload   Other NRE: NONE   DID A NON-ROUTINE EVENT OCCUR? No           Last vitals:  Vitals:    12/31/24 2132 12/31/24 2240 01/01/25 0300   BP:  127/64 112/62   Pulse:      Resp:  16 16   Temp:  36.4  C (97.5  F) 36.7  C (98  F)   SpO2: 98% 98% 98%       Electronically Signed By: Mckayla Cabrera MD  January 1, 2025  7:24 AM

## 2025-01-01 NOTE — ANESTHESIA PROCEDURE NOTES
"Intrathecal injection Procedure Note    Pre-Procedure   Staff -        Anesthesiologist:  Mckayla Cabrera MD       Performed By: anesthesiologist       Location: OB       Procedure Start/Stop Times: 12/31/2024 7:12 PM and 12/31/2024 7:14 PM       Pre-Anesthestic Checklist: patient identified, IV checked, risks and benefits discussed, informed consent, monitors and equipment checked, pre-op evaluation, at physician/surgeon's request and post-op pain management  Timeout:       Correct Patient: Yes        Correct Procedure: Yes        Correct Site: Yes        Correct Position: Yes   Procedure Documentation  Procedure: intrathecal injection         Patient Position: sitting       Skin prep: Chloraprep       Insertion Site: L3-4. (midline approach).       Needle Gauge: 25.        Needle Length (Inches): 5        Spinal Needle Type: Pencan       Introducer used       Introducer: 20 G       # of attempts: 1 and  # of redirects:  0    Assessment/Narrative         Paresthesias: No.       CSF fluid: clear.       Opening pressure was cmH2O while  Sitting.      Medication(s) Administered   0.25% PF Bupivacaine (Intrathecal) - Intrathecal, Back   1.5 mL - 12/31/2024 7:12:00 PM  Fentanyl PF (Intrathecal) - Intrathecal   25 mcg - 12/31/2024 7:12:00 PM  Medication Administration Time: 12/31/2024 7:12 PM     Comments:  Positive id and consent. Risks, options and plan discussed, patient understands and agrees to proceed.  Pt in sitting position.  Back sterilely prepped and draped.  Landmarks assessed.  1% lidocaine, 2 ml, used for skin wheal.  18 gauge introducer placed.  25 gauge 3.5 inch pencil point needle passed easily at L34.  Clear, free flow CSF obtained with one attempt at approximately L3-4 level.  Patient laid supine with CRYS.  Level T6 bilaterally.      FOR [facility] ONLY:   Pain Team Contact information: please page the Pain Team Via [email or web address]. Search \"Pain\". During daytime hours, please page the attending first. " At night please page the resident first.

## 2025-01-01 NOTE — PROGRESS NOTES
Patient requested epidural at 185, patient standing and swaying at bedside. Supplies gathered and anesthesia paged at 1900 to come to bedside. Patient reporting rectal pressure, denies urge to push. 8cm dilated with bulging bag of fluid at 190. Dr. William requested to bedside at 190, patient now reporting urge to push and SROM.  Anesthesia at bedside 190, patient unable to sit for spinal, anesthesia placed spinal while patient leaning over bedside table. Patient positioned into bed after spinal.  female infant at 192, immediate skin to skin with mother and delayed cord clamping.

## 2025-01-01 NOTE — PLAN OF CARE
Problem: Postpartum (Vaginal Delivery)  Goal: Optimal Pain Control and Function  Intervention: Prevent or Manage Pain  Recent Flowsheet Documentation  Taken 1/1/2025 4817 by Taylor Marte RN  Pain Management Interventions: medication (see MAR)   Goal Outcome Evaluation:      Plan of Care Reviewed With: patient    Overall Patient Progress: improvingOverall Patient Progress: improving       Pt is taking prn pain medications in order to stay on top of pain management. Pt is rating pain a 1 on the 0-10  pain scale. Pt is up ambulating in the room independently.

## 2025-01-01 NOTE — PROGRESS NOTES
Data: Dari Tidwell transferred to [facility]19/PNGJ10-3 via ambulation. Patient transferred to Postpartum with .    Action: Receiving unit notified of transfer. Patient and support person notified of room change. Patient and belongings accompanied by Registered Nurse. Bedside report given to Georgie GUTIERREZ. Fundal check performed with receiving RN. Oriented patient to surroundings. Call light within reach.    Response: Patient tolerated transfer.    Milka Hurst RN  2024 10:34 PM

## 2025-01-01 NOTE — L&D DELIVERY NOTE
VAGINAL DELIVERY NOTE      Dari Tidwell MRN# [id number]   Age: 34 year old YOB: 1990     PRE DELIVERY DIAGNOSIS  1) Intrauterine pregnancy at 39w1d   2) Spontaneous labor  3) Gestational diabetes on insulin  4) GBS positive      POST DELIVERY DIAGNOSIS  1) Intrauterine pregnancy at 39w1d   2) Delivery of a living female.     1 Minute 5 Minute 10 Minute   Apgar Totals: 9   9          3) Weight: pending    Delivery Method/Type:   Vaginal, Spontaneous    PROVIDER:   Porsha William MD     ANESTHESIA:  spinal    Labor Complications: None  Delivery QBL:   0   ROM to Delivery Time: (Delivered) Minutes: 10    DESCRIPTION OF PROCEDURE  Dari Tidwell is a 34 year old  with prenatal care with Dr. Scherer who was admitted at 39w1d for spontaneous labor. She actively labored rapidly. Dari Tidwell delivered a viable infant with apgars of 9  and 9 . Delivery was via vaginal, spontaneous under spinal anesthesia. Infant delivered in vertex left occiput anterior position. Anterior and posterior shoulders delivered without difficulty. Delayed cord clamping was performed. The cord was clamped, cut twice and 3 vessels were noted.     Cord complications: none  Placenta delivered at 2024  7:27 PM. Placental disposition was Hospital disposal. Pitocin was administered after baby. Fundal massage performed and fundus found to be firm.     Episiotomy: none   Perineum, vagina, cervix were inspected, and the following lacerations were noted:   Perineal lacerations: none        vaginal laceration noted       Any lacerations were repaired in the usual fashion using 3-0 Vicryl with 2 figure of 8 stitches.    Excellent hemostasis was noted. Needle count correct. Infant and patient in delivery room in stable condition.      Porsha William MD          Yaw, Female-Dari [[id number]]      Labor Event Times      Latent labor onset date/time: 2024 1400    Active labor onset date:  24 Onset time:  6:00 PM   Dilation complete date: 24 Complete time:  7:00 PM   Start pushing date/time: 2024          Labor Events     labor?: No   steroids: None  Labor Type: Spontaneous  Predominate monitoring during 1st stage: continuous electronic fetal monitoring     Antibiotics received during labor?: Yes  Reason for Antibiotics: GBS  Antibiotics received for GBS: Penicillin  Antibiotics Given (GBS): Less than or equal to 4 hours prior to delivery     Rupture identifier: Sac 1  Rupture date/time: 24   Rupture type: Spontaneous Rupture of Membranes  Fluid color: Clear     Augmentation: None       Delivery/Placenta Date and Time      Delivery Date: 24 Delivery Time:  7:25 PM   Placenta Date/Time: 2024  7:27 PM  Oxytocin given at the time of delivery: after delivery of baby  Delivering clinician: Porsha William MD   Other personnel present at delivery:  Provider Role   Porsha William MD Schwantz, Tara, RN Malmgren, Leah, Araceli Myers RN              Vaginal Counts       Initial count performed by 2 team members:  Two Team Members   carol oconnor         Needles Suture Needles Sponges (RETIRED) Instruments   Initial counts  1     Added to count       Relief counts       Final counts               Placed during labor Accounted for at the end of labor   FSE NA    IUPC NA    Cervidil NA                   Final count performed by 2 team members:  Two Team Members   juliocesar oconnor      Final count correct?: Yes  Pre-Birth Team Brief: Complete  Post-Birth Team Debrief: Complete       Apgars    Living status: Living   1 Minute 5 Minute 10 Minute 15 Minute 20 Minute   Skin color: 1  1       Heart rate: 2  2       Reflex irritability: 2  2       Muscle tone: 2  2       Respiratory effort: 2  2       Total: 9  9       Apgars assigned by: ALAN       Cord      Vessels: 3 Vessels    Cord Complications: None                Gases Sent?: No    Delayed cord clamping?: Yes    Cord Clamping Delay (seconds):  seconds    Stem cell collection?: No            Resuscitation    Methods: None       Skin to Skin and Feeding Plan      Skin to skin initiation date/time: 1841    Skin to skin with: Mother  Skin to skin end date/time:            Labor Events and Shoulder Dystocia    Fetal Tracing Prior to Delivery: Category 1       Delivery (Maternal) (Provider to Complete) ([id number])    Episiotomy: None  Perineal lacerations: None      Vaginal laceration?: Yes Repaired?: Yes   Repair suture: 3-0 Vicryl  Genital tract inspection done: Pos       Blood Loss  Mother: Dari Tidwell #[id number]     Start of Mother's Information      Delivery Blood Loss   Intrapartum & Postpartum: 24 1800 - 24    Delivery Admission: 24 1548 - 24         Intrapartum & Postpartum Delivery Admission    None                  End of Mother's Information  Mother: Dari Tidwell #[id number]                Delivery - Provider to Complete ([id number])    Delivering clinician: Porsha William MD  Delivery Type (Choose the 1 that will go to the Birth History): Vaginal, Spontaneous                         Other personnel:  Provider Role   Porsha William MD Schwantz, Tara, RN Malmgren, Leah, RN Hephner, Gracie, RN                     Placenta    Date/Time: 2024  7:27 PM  Removal: Spontaneous  Disposition: Hospital disposal             Anesthesia    Method: Spinal                    Presentation and Position    Presentation: Vertex    Position: Left Occiput Anterior

## 2025-01-01 NOTE — PLAN OF CARE
"  Problem: Adult Inpatient Plan of Care  Goal: Patient-Specific Goal (Individualized)  Description: You can add care plan individualizations to a care plan. Examples of Individualization might be:  \"Parent requests to be called daily at 9am for status\", \"I have a hard time hearing out of my right ear\", or \"Do not touch me to wake me up as it startles  me\".  12/31/2024 2238 by Milka Hurst RN  Outcome: Progressing  12/31/2024 2236 by Milka Hurst RN  Outcome: Progressing  12/31/2024 2235 by Milka Hurst, RN  Outcome: Not Progressing   Goal Outcome Evaluation:       Plan to bundle cares to optimize feeding/sleep schedule.    Overall Patient Progress: improvingOverall Patient Progress: improving    Outcome Evaluation: Dari is stable postpartum and transitioning to motherhood beautifully.      "

## 2025-01-01 NOTE — ANESTHESIA PREPROCEDURE EVALUATION
"Anesthesia Pre-Procedure Evaluation    Patient: Dari Tidwell   MRN: [id number] : 1990        Procedure :           Past Medical History:   Diagnosis Date    Gestational diabetes       Past Surgical History:   Procedure Laterality Date    WISDOM TOOTH EXTRACTION        No Known Allergies   Social History     Tobacco Use    Smoking status: Never    Smokeless tobacco: Never   Substance Use Topics    Alcohol use: Not Currently     Comment: Alcoholic Drinks/day: rare; less than 1 drink/month      Wt Readings from Last 1 Encounters:   10/08/24 78.9 kg (174 lb)        Anesthesia Evaluation            ROS/MED HX  ENT/Pulmonary:  - neg pulmonary ROS     Neurologic:  - neg neurologic ROS     Cardiovascular:  - neg cardiovascular ROS     METS/Exercise Tolerance:     Hematologic:  - neg hematologic  ROS     Musculoskeletal:  - neg musculoskeletal ROS     GI/Hepatic:  - neg GI/hepatic ROS     Renal/Genitourinary:  - neg Renal ROS     Endo:  - neg endo ROS   (+) type I DM,                     Psychiatric/Substance Use:  - neg psychiatric ROS     Infectious Disease:  - neg infectious disease ROS     Malignancy:  - neg malignancy ROS     Other:  - neg other ROS          Physical Exam    Airway        Mallampati: II   TM distance: > 3 FB   Neck ROM: full     Respiratory Devices and Support         Dental           Cardiovascular             Pulmonary                   OUTSIDE LABS:  CBC:   Lab Results   Component Value Date    WBC 11.4 (H) 2024    WBC 9.0 2024    HGB 12.4 2024    HGB 11.9 2024    HCT 35.6 2024    HCT 35.9 2024     2024     2024     BMP: No results found for: \"NA\", \"POTASSIUM\", \"CHLORIDE\", \"CO2\", \"BUN\", \"CR\", \"GLC\"  COAGS: No results found for: \"PTT\", \"INR\", \"FIBR\"  POC: No results found for: \"BGM\", \"HCG\", \"HCGS\"  HEPATIC: No results found for: \"ALBUMIN\", \"PROTTOTAL\", \"ALT\", \"AST\", \"GGT\", \"ALKPHOS\", \"BILITOTAL\", \"BILIDIRECT\", \"WILDA\"  OTHER: "   Lab Results   Component Value Date    A1C 4.8 12/31/2024       Anesthesia Plan    ASA Status:  2    NPO Status:  ELEVATED Aspiration Risk/Unknown    Anesthesia Type: Epidural.              Consents    Anesthesia Plan(s) and associated risks, benefits, and realistic alternatives discussed. Questions answered and patient/representative(s) expressed understanding.     - Discussed: Risks, Benefits and Alternatives for BOTH SEDATION and the PROCEDURE were discussed     - Discussed with:  Patient      - Extended Intubation/Ventilatory Support Discussed: No.      - Patient is DNR/DNI Status: No     Use of blood products discussed: Yes.     - Discussed with: Patient.     - Consented: consented to blood products     Postoperative Care    Pain management: IV analgesics, Oral pain medications.   PONV prophylaxis: Ondansetron (or other 5HT-3), Dexamethasone or Solumedrol     Comments:    Other Comments: Plan Labor epidural.  Risks, options and plan discussed, patient understands and agrees to proceed. Risks discussed include but are not limited to infection, bleeding, failure (10-20%) headache (1%), effects on blood pressure and effects on baby, rare back problems, nerve damage, and paralysis.             Mckayla Cabrera MD    I have reviewed the pertinent notes and labs in the chart from the past 30 days and (re)examined the patient.  Any updates or changes from those notes are reflected in this note.

## 2025-01-02 VITALS
OXYGEN SATURATION: 99 % | RESPIRATION RATE: 18 BRPM | HEART RATE: 68 BPM | WEIGHT: 180.4 LBS | TEMPERATURE: 98.2 F | DIASTOLIC BLOOD PRESSURE: 76 MMHG | SYSTOLIC BLOOD PRESSURE: 132 MMHG | BODY MASS INDEX: 30.97 KG/M2

## 2025-01-02 PROCEDURE — 250N000011 HC RX IP 250 OP 636: Performed by: OBSTETRICS & GYNECOLOGY

## 2025-01-02 PROCEDURE — 90471 IMMUNIZATION ADMIN: CPT | Performed by: OBSTETRICS & GYNECOLOGY

## 2025-01-02 PROCEDURE — 90707 MMR VACCINE SC: CPT | Performed by: OBSTETRICS & GYNECOLOGY

## 2025-01-02 PROCEDURE — 250N000013 HC RX MED GY IP 250 OP 250 PS 637: Performed by: OBSTETRICS & GYNECOLOGY

## 2025-01-02 RX ORDER — DOCUSATE SODIUM 100 MG/1
100 CAPSULE, LIQUID FILLED ORAL DAILY PRN
COMMUNITY
Start: 2025-01-02

## 2025-01-02 RX ORDER — IBUPROFEN 200 MG
800 TABLET ORAL EVERY 8 HOURS PRN
COMMUNITY
Start: 2025-01-02

## 2025-01-02 RX ORDER — ACETAMINOPHEN 325 MG/1
650 TABLET ORAL EVERY 4 HOURS PRN
COMMUNITY
Start: 2025-01-02

## 2025-01-02 RX ADMIN — ACETAMINOPHEN 650 MG: 325 TABLET ORAL at 07:57

## 2025-01-02 RX ADMIN — MEASLES, MUMPS, AND RUBELLA VIRUS VACCINE LIVE 0.5 ML: 1000; 12500; 1000 INJECTION, POWDER, LYOPHILIZED, FOR SUSPENSION SUBCUTANEOUS at 16:32

## 2025-01-02 RX ADMIN — IBUPROFEN 800 MG: 800 TABLET ORAL at 03:33

## 2025-01-02 RX ADMIN — DOCUSATE SODIUM 100 MG: 100 CAPSULE, LIQUID FILLED ORAL at 09:22

## 2025-01-02 ASSESSMENT — ACTIVITIES OF DAILY LIVING (ADL)
ADLS_ACUITY_SCORE: 28

## 2025-01-02 NOTE — DISCHARGE INSTRUCTIONS
Warning Signs after Having a Baby    Keep this paper on your fridge or somewhere else where you can see it.    Call your provider if you have any of these symptoms up to 12 weeks after having your baby.    Thoughts of hurting yourself or your baby  Pain in your chest or trouble breathing  Severe headache not helped by pain medicine  Eyesight concerns (blurry vision, seeing spots or flashes of light, other changes to eyesight)  Fainting, shaking or other signs of a seizure    Call 9-1-1 if you feel that it is an emergency.     The symptoms below can happen to anyone after giving birth. They can be very serious. Call your provider if you have any of these warning signs.    My provider s phone number: _______________________    Losing too much blood (hemorrhage)    Call your provider if you soak through a pad in less than an hour or pass blood clots bigger than a golf ball. These may be signs that you are bleeding too much.    Blood clots in the legs or lungs    After you give birth, your body naturally clots its blood to help prevent blood loss. Sometimes this increased clotting can happen in other areas of the body, like the legs or lungs. This can block your blood flow and be very dangerous.     Call your provider if you:  Have a red, swollen spot on the back of your leg that is warm or painful when you touch it.   Are coughing up blood.     Infection    Call your provider if you have any of these symptoms:  Fever of 100.4 F (38 C) or higher.  Pain or redness around your stitches if you had an incision.   Any yellow, white, or green fluid coming from places where you had stitches or surgery.    Mood Problems (postpartum depression)    Many people feel sad or have mood changes after having a baby. But for some people, these mood swings are worse.     Call your provider right away if you feel so anxious or nervous that you can't care for yourself or your baby.    Preeclampsia (high blood pressure)    Even if you  didn't have high blood pressure when you were pregnant, you are at risk for the high blood pressure disease called preeclampsia. This risk can last up to 12 weeks after giving birth.     Call your provider if you have:   Pain on your right side under your rib cage  Sudden swelling in the hands and face    Remember: You know your body. If something doesn't feel right, get medical help.     For informational purposes only. Not to replace the advice of your health care provider. Copyright 2020 [facility]. All rights reserved. Clinically reviewed by Yeimy Araujo, RNC-OB, MSN. "[employer] 311477 - Rev 02/23.

## 2025-01-02 NOTE — PLAN OF CARE
Patient stated to have a pain of 1-2/10 due to abdominal cramping, ibuprofen PRN given. Pain relieved.  Up and ambulating independently.  Patient feeds, holds, speaks, and does skin-to-skin time with infant.  Patient is breast feeding infant and supplementing with pumped milk. Started pumping overnight. Encouraged mother to feed every 2 hours.  Parents participate in infant cares and ask appropriate questions.  Patient still working on paperwork. Encouraged to complete.    Problem: Postpartum (Vaginal Delivery)  Goal: Optimal Pain Control and Function  Intervention: Prevent or Manage Pain  Recent Flowsheet Documentation  Taken 1/2/2025 0333 by Taylor Ma, RN  Pain Management Interventions: medication (see MAR)  Taken 1/1/2025 2204 by Taylor Ma, RN  Pain Management Interventions: medication (see MAR)  Perineal Care: perineal hygiene encouraged     Problem: Breastfeeding  Goal: Effective Breastfeeding  Intervention: Promote Breast Care and Comfort  Recent Flowsheet Documentation  Taken 1/2/2025 0130 by Taylor Ma, RN  Breast Care: double electric breast pump utilized  Taken 1/1/2025 2230 by Taylor Ma, RN  Breast Care: double electric breast pump utilized     Goal Outcome Evaluation:      Plan of Care Reviewed With: patient    Overall Patient Progress: improvingOverall Patient Progress: improving    Outcome Evaluation: Patient's VSS and maternal assessments WDL.

## 2025-01-02 NOTE — LACTATION NOTE
This note was copied from a baby's chart.  Initial Lactation Visit    Current age: 1 day 16 hours old    Gestational age at delivery: 39w1d     Diaper count: 4 wet 4 soiled      Feedings: 11 breast  3 supplement      Weight Loss: 6% at  second weight check down 4% at 24 hours     Breastfeeding goals:12+months    Past breastfeeding experience: exclusively pumped- donated breastmilk      Maternal health risk that may affect breastfeeding:None    Home Pump: Spectra     Breast assessment: Breast: Round and Symmetrical,  Nipples: Everted    Feeding assessment: Infant was very sleepy this attempt- her blood sugar was low so attempted for 10 min before moving onto bottle.   Infant needed rousing to bottle feed and did take 30 ml of mom's pumped milk.      Feeding plan:   Offer breast and supplement 30 ml after each attempt.    Pump for any supplement      Follow up: Will follow up if here Friday or OP next week

## 2025-01-02 NOTE — DISCHARGE SUMMARY
Discharge Summary    Dari Tidwell MRN# [id number]   Age: 34 year old YOB: 1990     Date of Admission:  12/31/2024  Date of Discharge::  1/2/2025  Admitting Physician:  Porsha William MD  Discharge Physician:  Jessica Moritz, APRN CNP     [facility]: [facility]          Admission Diagnoses:   Normal labor  Intrauterine pregnancy at 39w1d  GBS positive  GDMA2          Discharge Diagnosis:   Same   S/p vaginal delivery          Procedures:     Procedure(s): Vaginal delivery               Medications Prior to Admission:     Medications Prior to Admission   Medication Sig Dispense Refill Last Dose/Taking    Prenatal Vit-Fe Fumarate-FA (PNV PRENATAL PLUS MULTIVITAMIN) 27-1 MG TABS per tablet Take 1 tablet by mouth daily.   12/31/2024 Morning    blood glucose (NO BRAND SPECIFIED) test strip USE AS DIRECTED TO TEST FOUR TIMES DAILY       blood glucose (NO BRAND SPECIFIED) test strip USE AS DIRECTED TO TEST FOUR TIMES DAILY       insulin pen needle (32G X 4 MM) 32G X 4 MM miscellaneous Use 1-5 pen needles daily or as directed. 200 each 5     insulin pen needle (32G X 4 MM) 32G X 4 MM miscellaneous Use 1 pen needles daily or as directed. 100 each 2     [DISCONTINUED] insulin aspart (NOVOLOG FLEXPEN) 100 UNIT/ML pen Inject 2 Units subcutaneously daily (with lunch). Increase by 2 units every 2 days until post-lunch BG is at goals (less than 140 1 hour OR less than 120 2 hours) 15 mL 1     [DISCONTINUED] insulin NPH (HUMULIN N KWIKPEN) 100 UNIT/ML injection Inject 38-50 Units subcutaneously at bedtime. If fasting BG 95 or greater increase by 2 units each time as long as does not lead to low blood sugars less than 70. 45 mL 1 12/30/2024 at  9:00 PM    [DISCONTINUED] insulin  UNIT/ML injection Inject 10 Units subcutaneously every morning (before breakfast). 15 mL 0 12/30/2024 at  8:00 AM    [DISCONTINUED] MULTIVITAMIN ORAL [MULTIVITAMIN ORAL] Take by mouth.                Discharge  Medications:        Review of your medicines        START taking        Dose / Directions   acetaminophen 325 MG tablet  Commonly known as: TYLENOL      Dose: 650 mg  Take 2 tablets (650 mg) by mouth every 4 hours as needed for mild pain or fever (greater than or equal to 38 C /100.4 F (oral) or 38.5 C/ 101.4 F (core).).  Refills: 0     benzocaine-menthol 20-0.5 % Aero  Commonly known as: DERMOPLAST      Dose: 1 applicator  Apply 1 g topically 4 times daily as needed (perineal pain).  Refills: 0     docusate sodium 100 MG capsule  Commonly known as: COLACE      Dose: 100 mg  Take 1 capsule (100 mg) by mouth daily as needed for constipation.  Refills: 0     ibuprofen 200 MG tablet  Commonly known as: ADVIL/MOTRIN      Dose: 800 mg  Take 4 tablets (800 mg) by mouth every 8 hours as needed for other (cramping).  Refills: 0     witch hazel-glycerin pad  Commonly known as: TUCKS      Apply topically every hour as needed for hemorrhoids or other (episiotomy pain/itching).  Refills: 0            CONTINUE these medicines which have NOT CHANGED        Dose / Directions   * blood glucose test strip  Commonly known as: NO BRAND SPECIFIED      USE AS DIRECTED TO TEST FOUR TIMES DAILY  Refills: 0     * blood glucose test strip  Commonly known as: NO BRAND SPECIFIED      USE AS DIRECTED TO TEST FOUR TIMES DAILY  Refills: 0     * insulin pen needle 32G X 4 MM miscellaneous  Commonly known as: 32G X 4 MM  Used for: Insulin controlled gestational diabetes mellitus (GDM) in second trimester      Use 1 pen needles daily or as directed.  Quantity: 100 each  Refills: 2     * insulin pen needle 32G X 4 MM miscellaneous  Commonly known as: 32G X 4 MM  Used for: Gestational diabetes mellitus (GDM) in second trimester, gestational diabetes method of control unspecified      Use 1-5 pen needles daily or as directed.  Quantity: 200 each  Refills: 5     PNV Prenatal Plus Multivitamin 27-1 MG Tabs per tablet      Dose: 1 tablet  Take 1 tablet  by mouth daily.  Refills: 0           * This list has 4 medication(s) that are the same as other medications prescribed for you. Read the directions carefully, and ask your doctor or other care provider to review them with you.                STOP taking      HumuLIN N KWIKPEN 100 UNIT/ML susp  Generic drug: insulin NPH        insulin  UNIT/ML injection        MULTIVITAMIN PO        NovoLOG FLEXPEN 100 UNIT/ML soln  Generic drug: insulin aspart                  Where to get your medicines        Some of these will need a paper prescription and others can be bought over the counter. Ask your nurse if you have questions.    You don't need a prescription for these medications  acetaminophen 325 MG tablet  benzocaine-menthol 20-0.5 % Aero  docusate sodium 100 MG capsule  ibuprofen 200 MG tablet  witch hazel-glycerin pad               Consultations:   Lactation          Brief History of Labor:   Dari Tidwell is a 34 year old  who presented at 39w1d for spontaneous labor. She actively labored rapidly. Dari Tidwell delivered a viable infant with apgars of 9  and 9 . Delivery was via vaginal, spontaneous under spinal anesthesia. Infant delivered in vertex left occiput anterior position. Anterior and posterior shoulders delivered without difficulty. Delayed cord clamping was performed. The cord was clamped, cut twice and 3 vessels were noted.      Cord complications: none  Placenta delivered at 2024  7:27 PM. Placental disposition was Hospital disposal. Pitocin was administered after baby. Fundal massage performed and fundus found to be firm.      Episiotomy: none   Perineum, vagina, cervix were inspected, and the following lacerations were noted:   Perineal lacerations: none        vaginal laceration noted   .           Hospital Course:   The patient's hospital course was unremarkable.  On discharge, her pain was well controlled. Vaginal bleeding is similar to peak menstrual flow.  Voiding without  difficulty.  Ambulating well and tolerating a normal diet.  Breastfeeding.  Infant is stable. She was discharged on post-partum day #2.    Post-partum hemoglobin:   Hemoglobin   Date Value Ref Range Status   01/01/2025 11.3 (L) 11.7 - 15.7 g/dL Final         Day of discharge assessment:   /70 (BP Location: Right arm)   Pulse 67   Temp 97.6  F (36.4  C) (Oral)   Resp 18   LMP 03/25/2024 (Approximate)   SpO2 98%   Breastfeeding Unknown   Abdomen: Soft, fundus firm @ umbilicus.        Discharge Instructions and Follow-Up:     Discharge diet: Regular   Discharge activity: Activity as tolerated, pelvic rest for 6 weeks   Discharge follow-up: Follow up with Dr. Scherer in 6 weeks    Wound care: Sitz baths           Discharge Disposition:     Discharged to home      Attestation:  I have reviewed today's vital signs, notes, medications, labs and imaging.  Amount of time performed on this discharge summary: 5 minutes.  Total time: 15 minutes    Jessica Moritz, APRN CNP

## 2025-01-02 NOTE — PLAN OF CARE
Goal Outcome Evaluation:      Plan of Care Reviewed With: patient    Overall Patient Progress: improvingOverall Patient Progress: improving           Problem: Adult Inpatient Plan of Care  Goal: Optimal Comfort and Wellbeing  Intervention: Monitor Pain and Promote Comfort  Recent Flowsheet Documentation  Taken 1/2/2025 7050 by Taylor Marte RN  Pain Management Interventions: medication (see MAR)  Pt is taking prn pain medications in order to stay on top of pain management. Pt is rating pain a 2 on the 0-10 pain scale. Pt is up ambulating in the room independently.

## 2025-01-02 NOTE — PROGRESS NOTES
D:  Patient desires discharge home.  Discharge orders received and entered by provider.  A:  Discharge instructions reviewed with the patient.  All questions and concerns addressed.  R:  Discharge criteria met.  4 Part ID bands double checked.  [place] discharged in car seat with parents.  The nursing assistant escorted patient to car .Delilah Lorenzo RN on 2025 at 4:56 PM

## 2025-01-02 NOTE — PLAN OF CARE
"  Problem: Adult Inpatient Plan of Care  Goal: Plan of Care Review  Description: The Plan of Care Review/Shift note should be completed every shift.  The Outcome Evaluation is a brief statement about your assessment that the patient is improving, declining, or no change.  This information will be displayed automatically on your shift  note.  Outcome: Met  Flowsheets (Taken 1/2/2025 1656)  Outcome Evaluation: Patient has stable vitals and assessments. Discharge criteria met. Reviewed instructions, verbalized understanding of discharge plan of care.  Plan of Care Reviewed With: patient  Overall Patient Progress: improving  Goal: Patient-Specific Goal (Individualized)  Description: You can add care plan individualizations to a care plan. Examples of Individualization might be:  \"Parent requests to be called daily at 9am for status\", \"I have a hard time hearing out of my right ear\", or \"Do not touch me to wake me up as it startles  me\".  Outcome: Met  Goal: Absence of Hospital-Acquired Illness or Injury  Outcome: Met  Intervention: Prevent Skin Injury  Recent Flowsheet Documentation  Taken 1/2/2025 1600 by Delilah Lorenzo RN  Body Position: position changed independently  Intervention: Prevent and Manage VTE (Venous Thromboembolism) Risk  Recent Flowsheet Documentation  Taken 1/2/2025 1600 by Delilah Lorenzo RN  VTE Prevention/Management: other (see comments)  Intervention: Prevent Infection  Recent Flowsheet Documentation  Taken 1/2/2025 1600 by Delilah Lorenzo RN  Infection Prevention:   cohorting utilized   environmental surveillance performed   equipment surfaces disinfected   hand hygiene promoted   personal protective equipment utilized   rest/sleep promoted  Goal: Optimal Comfort and Wellbeing  Outcome: Met  Intervention: Provide Person-Centered Care  Recent Flowsheet Documentation  Taken 1/2/2025 1600 by Delilah Lorenzo RN  Trust Relationship/Rapport:   care explained   choices provided   " questions encouraged   questions answered   thoughts/feelings acknowledged   empathic listening provided  Goal: Readiness for Transition of Care  Outcome: Met   Goal Outcome Evaluation:      Plan of Care Reviewed With: patient    Overall Patient Progress: improvingOverall Patient Progress: improving    Outcome Evaluation: Patient has stable vitals and assessments. Discharge criteria met. Reviewed instructions, verbalized understanding of discharge plan of care.Delilah Lorenzo RN on 1/2/2025 at 4:59 PM

## 2025-02-10 ENCOUNTER — LAB REQUISITION (OUTPATIENT)
Dept: LAB | Facility: CLINIC | Age: 35
End: 2025-02-10

## 2025-02-10 DIAGNOSIS — Z30.430 ENCOUNTER FOR INSERTION OF INTRAUTERINE CONTRACEPTIVE DEVICE: ICD-10-CM

## 2025-02-10 PROCEDURE — 87491 CHLMYD TRACH DNA AMP PROBE: CPT | Performed by: OBSTETRICS & GYNECOLOGY

## 2025-02-10 PROCEDURE — 87591 N.GONORRHOEAE DNA AMP PROB: CPT | Performed by: OBSTETRICS & GYNECOLOGY

## 2025-02-11 LAB
C TRACH DNA SPEC QL PROBE+SIG AMP: NEGATIVE
N GONORRHOEA DNA SPEC QL NAA+PROBE: NEGATIVE
SPECIMEN TYPE: NORMAL